# Patient Record
Sex: MALE | Race: WHITE | NOT HISPANIC OR LATINO | Employment: FULL TIME | URBAN - NONMETROPOLITAN AREA
[De-identification: names, ages, dates, MRNs, and addresses within clinical notes are randomized per-mention and may not be internally consistent; named-entity substitution may affect disease eponyms.]

---

## 2023-04-21 ENCOUNTER — HOSPITAL ENCOUNTER (OUTPATIENT)
Dept: RADIOLOGY | Facility: CLINIC | Age: 23
Discharge: HOME | End: 2023-04-21

## 2023-04-21 DIAGNOSIS — M54.50 CHRONIC MIDLINE LOW BACK PAIN WITHOUT SCIATICA: ICD-10-CM

## 2023-04-21 DIAGNOSIS — M53.3 NONTRAUMATIC COCCYDYNIA: ICD-10-CM

## 2023-04-21 DIAGNOSIS — G89.29 CHRONIC MIDLINE LOW BACK PAIN WITHOUT SCIATICA: ICD-10-CM

## 2023-04-24 NOTE — PROGRESS NOTES
PT Evaluation     Today's date: 2023  Patient name: Kendrick Thompson  : 2000  MRN: [id number]  Referring provider: Cayla Guillen MD  Dx:   Encounter Diagnosis     ICD-10-CM    1  Chronic midline low back pain without sciatica  M54 50 Ambulatory Referral to Physical Therapy    G89 29       2  Nontraumatic coccydynia  M53 3 Ambulatory Referral to Physical Therapy      3  Femoral anteversion of left lower extremity  Q65 89 Ambulatory Referral to Physical Therapy                     Assessment  Assessment details: PT notes the patient with decrease ROM and strength t/o the lumbar spine as well as the bilateral hip and LE with trunk/core weakness leading to increase pain levels and functional limitations with need for course of skilled therapy for 4 weeks with focus on lumbar stabilization, strengthening, manual therapy, analgesic modalities, and HEP  Impairments: abnormal or restricted ROM, activity intolerance, impaired physical strength, lacks appropriate home exercise program and pain with function  Understanding of Dx/Px/POC: good   Prognosis: good    Goals  ST  Initiate HEP  2  Decrease pain levels by 25-50%   3  Increase ROM by 25-50%   4  Increase strength by 1-2 mm grades  LT  Improve spinal stability with increase trunk/core strength   2  Decrease limitations with standing, walking and stairs  3  Decrease limitations with trunk movement, lifting and carrying  4  Decrease limitations with transfers and ADL  5   DC with HEP    Plan  Plan details: PT notes review of POC and findings with the patient who is in agreement with PT recommendations of course of skilled therapy     Patient would benefit from: PT eval and skilled physical therapy  Planned modality interventions: thermotherapy: hydrocollator packs  Planned therapy interventions: manual therapy, neuromuscular re-education, self care, strengthening, stretching, therapeutic exercise, home exercise program, graded exercise and flexibility  Frequency: 2x week  Duration in weeks: 4  Treatment plan discussed with: patient        Subjective Evaluation    History of Present Illness  Mechanism of injury: Patient reports dealing with increase LBP and bilateral hip pain for 6+ years with no MATEO  Patient reports over time the symptoms have worsened with prior treatment with PT and medications with resolution of symptoms but recently the symptoms have worsened with increase pain levels around the sacrum with feeling of weakness in the right hip and LE  Patient denies any radicular symptoms in the bilateral LE  Patient went to ortho MD for evaluation and found to have no degeneration or issues with x-ray of the lumbar spine with referral to OPPT to address LB symptoms  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 [employer] with no significant PMH  Pain  Current pain ratin  At best pain ratin  At worst pain ratin  Location: Lumbar spine   Quality: sharp, tight and pulling  Relieving factors: change in position  Aggravating factors: lifting      Diagnostic Tests  X-ray: normal  Treatments  Previous treatment: physical therapy and medication  Current treatment: physical therapy  Patient Goals  Patient goals for therapy: decreased pain, increased motion, increased strength, independence with ADLs/IADLs and return to sport/leisure activities          Objective     Postural Observations  Seated posture: fair  Standing posture: fair        Tenderness     Lumbar Spine  No tenderness in the spinous process  Left Hip   No tenderness in the PSIS, greater trochanter and sacroiliac joint  Right Hip   No tenderness in the PSIS, greater trochanter and sacroiliac joint       Neurological Testing     Reflexes   Left   Patellar (L4): normal (2+)  Achilles (S1): normal (2+)    Right   Patellar (L4): normal (2+)  Achilles (S1): normal (2+)    Active Range of Motion     Lumbar   Flexion: 47 degrees  with pain  Extension: 14 degrees  with pain  Left lateral flexion:  WFL  Right lateral flexion:  WFL  Left rotation:  WFL  Right rotation:  WFL  Left Hip   Flexion: 44 degrees   Extension: 5 degrees   Abduction: [facility]  External rotation (90/90): 21 degrees   Internal rotation (90/90): 11 degrees     Right Hip   Flexion: 49 degrees   Extension: 5 degrees   Abduction: WFL  External rotation (90/90): 17 degrees   Internal rotation (90/90): 11 degrees     Additional Active Range of Motion Details  Trunk/core weakness with abdominals of 3/5 and lumbar paraspinals of 3+/5   PT notes decrease ROM and strength t/o the bilateral hip and LE     Passive Range of Motion   Left Hip   Flexion: 54 degrees   Extension: 7 degrees   External rotation (90/90): 24 degrees   Internal rotation (90/90): 13 degrees     Right Hip   Flexion: 55 degrees   Extension: 8 degrees   External rotation (90/90): 22 degrees   Internal rotation (90/90): 13 degrees     Joint Play     Hypomobile: L2, L3, L4, L5 and S1     Strength/Myotome Testing     Left Hip   Planes of Motion   Flexion: 4+  Extension: 5  Abduction: 4+  Adduction: 5  External rotation: 4  Internal rotation: 4    Right Hip   Planes of Motion   Flexion: 4+  Extension: 5  Abduction: 4+  Adduction: 5  External rotation: 4  Internal rotation: 4    Tests     Left Hip   Negative SUSANAAMANDA long sit and Jorge  William: Positive  90/90 SLR: Positive  SLR: Positive  Right Hip   Negative AMANDA MEZA long sit and Jorge Thomas: Positive  90/90 SLR: Positive  SLR: Positive                Precautions:  Chronic LBP  POC 5/26/23    Manuals 4/26       Bilateral HS, quad, piriformis, and gastroc manual hip traction                                 Neuro Re-Ed        PPU with exhale         Prone OAL         Prone plank         Prone plank with hip ext and ABD        Supine Tball ABD crunch         Seated Tball Trunk rotation and PNF         Seated Tball LAQ and hip march         Ther Ex        Elliptical for cardio        [employer] Chop and Down         TB Palloff Press         TB Trunk Rotation         Side step and squat         S/L Clamshells                 HEP update/review  15 min        Ther Activity                        Gait Training                        Modalities

## 2023-04-26 ENCOUNTER — EVALUATION (OUTPATIENT)
Dept: PHYSICAL THERAPY | Facility: CLINIC | Age: 23
End: 2023-04-26

## 2023-04-26 DIAGNOSIS — Q65.89 FEMORAL ANTEVERSION OF LEFT LOWER EXTREMITY: ICD-10-CM

## 2023-04-26 DIAGNOSIS — G89.29 CHRONIC MIDLINE LOW BACK PAIN WITHOUT SCIATICA: ICD-10-CM

## 2023-04-26 DIAGNOSIS — M53.3 NONTRAUMATIC COCCYDYNIA: ICD-10-CM

## 2023-04-26 DIAGNOSIS — M54.50 CHRONIC MIDLINE LOW BACK PAIN WITHOUT SCIATICA: ICD-10-CM

## 2023-05-01 NOTE — PROGRESS NOTES
"Daily Note     Today's date: 2023  Patient name: Alyssia Smith  : 2000  MRN: [id number]  Referring provider: Glimar Turk MD  Dx:   Encounter Diagnosis     ICD-10-CM    1  Chronic midline low back pain without sciatica  M54 50     G89 29       2  Nontraumatic coccydynia  M53 3       3  Femoral anteversion of left lower extremity  Q65 89                      Subjective:  Patient reports he is compliant with HEP  Patient denies any LBP but states the back and hips are tight this morning  Objective: See treatment diary below      Assessment: Tolerated treatment well  PT notes start of POC with focus on lumbar stabilization and manual therapy to decrease symptoms and improve functional limitations to meet therapy goals  PT notes continuation of decrease ROM and strength t/o the lumbar spine as well as the bilateral hip and LE with trunk/core weakness with need for continuation of skilled therapy  Plan: Continue per plan of care        Precautions:  Chronic LBP  POC 23    Manuals  5      Bilateral HS, quad, piriformis, and gastroc manual hip traction   15 min                               Neuro Re-Ed        PPU with exhale   2x10   3\" Exhale       Prone OAL   10x Bilat       Prone plank   Seated Tball Resist Trunk Rotation   10x Bilat Single Black       Prone plank with hip ext and ABD        Supine Tball ABD crunch         Seated Tball Trunk rotation and PNF   10x Each Bilat       Seated Tball LAQ and hip march   10x Each Bilat       Ther Ex        Elliptical for cardio  10 min L1       Ron Chop and Down   10x Each Bilat 15#       TB Palloff Press   10x5\" Hold Single Black       TB Trunk Rotation   10x Bilat  Single Black       Side step and squat         S/L Clamshells                 HEP update/review  15 min        Ther Activity                        Gait Training                        Modalities                             "

## 2023-05-02 ENCOUNTER — OFFICE VISIT (OUTPATIENT)
Dept: PHYSICAL THERAPY | Facility: CLINIC | Age: 23
End: 2023-05-02

## 2023-05-02 DIAGNOSIS — G89.29 CHRONIC MIDLINE LOW BACK PAIN WITHOUT SCIATICA: Primary | ICD-10-CM

## 2023-05-02 DIAGNOSIS — M54.50 CHRONIC MIDLINE LOW BACK PAIN WITHOUT SCIATICA: Primary | ICD-10-CM

## 2023-05-02 DIAGNOSIS — Q65.89 FEMORAL ANTEVERSION OF LEFT LOWER EXTREMITY: ICD-10-CM

## 2023-05-02 DIAGNOSIS — M53.3 NONTRAUMATIC COCCYDYNIA: ICD-10-CM

## 2023-05-04 NOTE — PROGRESS NOTES
"Daily Note     Today's date: 2023  Patient name: Jessica Casillas  : 2000  MRN: [id number]  Referring provider: Justyna Bynum MD  Dx:   Encounter Diagnosis     ICD-10-CM    1  Chronic midline low back pain without sciatica  M54 50     G89 29       2  Nontraumatic coccydynia  M53 3       3  Femoral anteversion of left lower extremity  Q65 89                      Subjective:  Patient reports his thighs were sore and tired after the last session but no increase in LB symptoms  Objective: See treatment diary below      Assessment: Tolerated treatment well  PT notes continuation of decrease ROM and strength t/o the lumbar spine as well as the bilateral hip and LE with trunk/core weakness with need for continuation of skilled therapy  Plan: Continue per plan of care        Precautions:  Chronic LBP  POC 23    Manuals      Bilateral HS, quad, piriformis, and gastroc manual hip traction   15 min  15 min                              Neuro Re-Ed        PPU with exhale   2x10   3\" Exhale  2x10   3\" Exhale      Prone OAL   10x Bilat  10x Bilat      Prone plank   Seated Tball Resist Trunk Rotation   10x Bilat Single Black  10x Bilat   Single Black      Prone plank with hip ext and ABD        Supine Tball ABD crunch         Seated Tball Trunk rotation and PNF   10x Each Bilat  15x Each   Bilat      Seated Tball LAQ and hip march   10x Each Bilat  15x Each   Bilat      Ther Ex        Elliptical for cardio  10 min L1  10 min L1      [place] Chop and Down   10x Each Bilat 15#  10x Each   Bilat 15#      TB Palloff Press   10x5\" Hold Single Black  10x5\" Hold Single Black      TB Trunk Rotation   10x Bilat  Single Black  10x Bilat  Single Black      Side step and squat         S/L Clamshells                 HEP update/review  15 min        Ther Activity                        Gait Training                        Modalities                             "

## 2023-05-05 ENCOUNTER — OFFICE VISIT (OUTPATIENT)
Dept: PHYSICAL THERAPY | Facility: CLINIC | Age: 23
End: 2023-05-05

## 2023-05-05 DIAGNOSIS — G89.29 CHRONIC MIDLINE LOW BACK PAIN WITHOUT SCIATICA: Primary | ICD-10-CM

## 2023-05-05 DIAGNOSIS — Q65.89 FEMORAL ANTEVERSION OF LEFT LOWER EXTREMITY: ICD-10-CM

## 2023-05-05 DIAGNOSIS — M53.3 NONTRAUMATIC COCCYDYNIA: ICD-10-CM

## 2023-05-05 DIAGNOSIS — M54.50 CHRONIC MIDLINE LOW BACK PAIN WITHOUT SCIATICA: Primary | ICD-10-CM

## 2023-05-08 ENCOUNTER — APPOINTMENT (OUTPATIENT)
Dept: PHYSICAL THERAPY | Facility: CLINIC | Age: 23
End: 2023-05-08
Payer: COMMERCIAL

## 2023-05-08 NOTE — PROGRESS NOTES
"Daily Note     Today's date: 2023  Patient name: Nam Rushing  : 2000  MRN: [id number]  Referring provider: Lucero Multani MD  Dx:   Encounter Diagnosis     ICD-10-CM    1  Chronic midline low back pain without sciatica  M54 50     G89 29       2  Nontraumatic coccydynia  M53 3       3  Femoral anteversion of left lower extremity  Q65 89                      Subjective:  Patient reports his sinuses are bothering him today with allergies but overall states the back and hips are feeling good  Objective: See treatment diary below      Assessment: Tolerated treatment well  PT notes continuation of decrease ROM and strength t/o the lumbar spine as well as the bilateral hip and LE with trunk/core weakness with need for continuation of skilled therapy  Plan: Continue per plan of care        Precautions:  Chronic LBP  POC 23    Manuals     Bilateral HS, quad, piriformis, and gastroc manual hip traction   15 min  15 min  15 min                             Neuro Re-Ed        PPU with exhale   2x10   3\" Exhale  2x10   3\" Exhale  2x10   3\" Exhale     Prone OAL   10x Bilat  10x Bilat  On Tball  10x Bilat     Prone plank   Seated Tball Resist Trunk Rotation   10x Bilat Single Black  10x Bilat   Single Black  10x Bilat  Single Black     Prone plank with hip ext and ABD    Prone Tball Hip Extension 10x Bilat     Supine Tball ABD crunch     On Tball with 4# ball   10x     Seated Tball Trunk rotation and PNF   10x Each Bilat  15x Each   Bilat  10x Each   Bilat  4# Ball     Seated Tball LAQ and hip march   10x Each Bilat  15x Each   Bilat  DC    Ther Ex        Elliptical for cardio  10 min L1  10 min L1  10 min L2     Ron Chop and Down   10x Each Bilat 15#  10x Each   Bilat 15#  10x Each   15#     TB Palloff Press   10x5\" Hold Single Black  10x5\" Hold Single Black  10x5\" Hold Single Black     TB Trunk Rotation   10x Bilat  Single Black  10x Bilat  Single Black  10x Bilat   Singe " Black     Side step and squat         S/L Elidia                 HEP update/review  15 min        Ther Activity                        Gait Training                        Modalities

## 2023-05-09 ENCOUNTER — OFFICE VISIT (OUTPATIENT)
Dept: PHYSICAL THERAPY | Facility: CLINIC | Age: 23
End: 2023-05-09

## 2023-05-09 DIAGNOSIS — Q65.89 FEMORAL ANTEVERSION OF LEFT LOWER EXTREMITY: ICD-10-CM

## 2023-05-09 DIAGNOSIS — M53.3 NONTRAUMATIC COCCYDYNIA: ICD-10-CM

## 2023-05-09 DIAGNOSIS — M54.50 CHRONIC MIDLINE LOW BACK PAIN WITHOUT SCIATICA: Primary | ICD-10-CM

## 2023-05-09 DIAGNOSIS — G89.29 CHRONIC MIDLINE LOW BACK PAIN WITHOUT SCIATICA: Primary | ICD-10-CM

## 2023-05-11 NOTE — PROGRESS NOTES
"Daily Note     Today's date: 2023  Patient name: Russel Gill  : 2000  MRN: [id number]  Referring provider: Diony Nava MD  Dx:   Encounter Diagnosis     ICD-10-CM    1  Chronic midline low back pain without sciatica  M54 50     G89 29       2  Nontraumatic coccydynia  M53 3       3  Femoral anteversion of left lower extremity  Q65 89                      Subjective:  Patient reports he felt sore in the core after the last session to 3/10 level for a few hours but states the symptoms have dissipated this morning  Objective: See treatment diary below      Assessment: Tolerated treatment well  PT notes continuation of decrease ROM and strength t/o the lumbar spine with trunk/core weakness with need for continuation of skilled therapy  Plan: Continue per plan of care        Precautions:  Chronic LBP  POC 23    Manuals    Bilateral HS, quad, piriformis, and gastroc manual hip traction   15 min  15 min  15 min  15 min                           Neuro Re-Ed        PPU with exhale   2x10   3\" Exhale  2x10   3\" Exhale  2x10   3\" Exhale  2x10   3\" Exhale    Prone OAL   10x Bilat  10x Bilat  On Tball  10x Bilat  On Tball   10x Bilat    Prone plank   Seated Tball Resist Trunk Rotation   10x Bilat Single Black  10x Bilat   Single Black  10x Bilat  Single Black  10x Bilat  Single Black    Prone plank with hip ext and ABD    Prone Tball Hip Extension 10x Bilat  10x Bilat    Supine Tball ABD crunch     On Tball with 4# ball   10x  10x   4# Ball    Seated Tball Trunk rotation and PNF   10x Each Bilat  15x Each   Bilat  10x Each   Bilat  4# Ball  10x Each  4# Ball    Seated Tball LAQ and hip march   10x Each Bilat  15x Each   Bilat  DC    Ther Ex        Elliptical for cardio  10 min L1  10 min L1  10 min L2  10 min L2    Ron Chop and Down   10x Each Bilat 15#  10x Each   Bilat 15#  10x Each   15#  10x Each   15#    TB Palloff Press   10x5\" Hold Single Black  10x5\" Hold " "Single Black  10x5\" Hold Single Black  10x5\" Hold Single Black    TB Trunk Rotation   10x Bilat  Single Black  10x Bilat  Single Black  10x Bilat   Single Black  15x Bilat  Single    Side step and squat         S/L Clamshells                 HEP update/review  15 min        Ther Activity                        Gait Training                        Modalities                             "

## 2023-05-12 ENCOUNTER — OFFICE VISIT (OUTPATIENT)
Dept: PHYSICAL THERAPY | Facility: CLINIC | Age: 23
End: 2023-05-12

## 2023-05-12 DIAGNOSIS — G89.29 CHRONIC MIDLINE LOW BACK PAIN WITHOUT SCIATICA: Primary | ICD-10-CM

## 2023-05-12 DIAGNOSIS — Q65.89 FEMORAL ANTEVERSION OF LEFT LOWER EXTREMITY: ICD-10-CM

## 2023-05-12 DIAGNOSIS — M53.3 NONTRAUMATIC COCCYDYNIA: ICD-10-CM

## 2023-05-12 DIAGNOSIS — M54.50 CHRONIC MIDLINE LOW BACK PAIN WITHOUT SCIATICA: Primary | ICD-10-CM

## 2023-05-16 ENCOUNTER — APPOINTMENT (OUTPATIENT)
Dept: PHYSICAL THERAPY | Facility: CLINIC | Age: 23
End: 2023-05-16
Payer: COMMERCIAL

## 2023-05-16 NOTE — PROGRESS NOTES
"Daily Note     Today's date: 2023  Patient name: Jose Florentino  : 2000  MRN: [id number]  Referring provider: Ava Haas MD  Dx:   Encounter Diagnosis     ICD-10-CM    1  Chronic midline low back pain without sciatica  M54 50     G89 29       2  Nontraumatic coccydynia  M53 3       3  Femoral anteversion of left lower extremity  Q65 89                      Subjective: Pt reports no new complaints      Objective: See treatment diary below      Assessment:  Pt tolerated treatment session fairly well  Continuing to decreased flexibility of BLEs and is challenged with core strengthening exercises  He demonstrates fatigue post sesison and would benefit from continued OPPT sergvices     Plan: Continue per plan of care        Precautions:  Chronic LBP  POC 23    Manuals    Bilateral HS, quad, piriformis, and gastroc manual hip traction  15 min 15 min  15 min  15 min  15 min                           Neuro Re-Ed        PPU with exhale  2x10   3\" Exhale  2x10   3\" Exhale  2x10   3\" Exhale  2x10   3\" Exhale  2x10   3\" Exhale    Prone OAL  On Tball   10x Bilat  10x Bilat  10x Bilat  On Tball  10x Bilat  On Tball   10x Bilat    Prone plank  NT Seated Tball Resist Trunk Rotation   10x Bilat Single Black  10x Bilat   Single Black  10x Bilat  Single Black  10x Bilat  Single Black    Prone plank with hip ext and ABD 10x Bilat    Prone Tball Hip Extension 10x Bilat  10x Bilat    Supine Tball ABD crunch  10x   4# Ball    On Tball with 4# ball   10x  10x   4# Ball    Seated Tball Trunk rotation and PNF  10x Each  4# Ball  10x Each Bilat  15x Each   Bilat  10x Each   Bilat  4# Ball  10x Each  4# Ball    Seated Tball LAQ and hip march   10x Each Bilat  15x Each   Bilat  DC    Ther Ex        Elliptical for cardio 10 min L3 10 min L1  10 min L1  10 min L2  10 min L2    [place] Chop and Down  10x Each   15#  10x Each Bilat 15#  10x Each   Bilat 15#  10x Each   15#  10x Each   15#    TB Palloff " "Press  10x5\" Hold Single Black  10x5\" Hold Single Black  10x5\" Hold Single Black  10x5\" Hold Single Black  10x5\" Hold Single Black    TB Trunk Rotation  15x Bilat  Single  10x Bilat  Single Black  10x Bilat  Single Black  10x Bilat   Single Black  15x Bilat  Single    Side step and squat         S/L Clamshells                 HEP update/review         Ther Activity                        Gait Training                        Modalities                             "

## 2023-05-17 ENCOUNTER — OFFICE VISIT (OUTPATIENT)
Dept: PHYSICAL THERAPY | Facility: CLINIC | Age: 23
End: 2023-05-17

## 2023-05-17 DIAGNOSIS — M54.50 CHRONIC MIDLINE LOW BACK PAIN WITHOUT SCIATICA: Primary | ICD-10-CM

## 2023-05-17 DIAGNOSIS — G89.29 CHRONIC MIDLINE LOW BACK PAIN WITHOUT SCIATICA: Primary | ICD-10-CM

## 2023-05-17 DIAGNOSIS — M53.3 NONTRAUMATIC COCCYDYNIA: ICD-10-CM

## 2023-05-17 DIAGNOSIS — Q65.89 FEMORAL ANTEVERSION OF LEFT LOWER EXTREMITY: ICD-10-CM

## 2023-05-18 NOTE — PROGRESS NOTES
"Daily Note     Today's date: 2023  Patient name: Cayla Perez  : 2000  MRN: [id number]  Referring provider: Palmer Nash MD  Dx:   Encounter Diagnosis     ICD-10-CM    1  Chronic midline low back pain without sciatica  M54 50     G89 29       2  Nontraumatic coccydynia  M53 3       3  Femoral anteversion of left lower extremity  Q65 89                      Subjective: Patient reports no new c/o paint today  Patient reports that he is pleased with the progress he's making here at PT  Objective: See treatment diary below      Assessment: Tolerated treatment well  Patient exhibited good technique with therapeutic exercises and would benefit from continued PT to increase ROM/strength and endurance to improve mobility  Plan: Continue per plan of care        Precautions:  Chronic LBP  POC 23    Manuals    Bilateral HS, quad, piriformis, and gastroc manual hip traction  15 min 15'  15 min  15 min                           Neuro Re-Ed        PPU with exhale  2x10   3\" Exhale  3x10 3\"hold exhale  2x10   3\" Exhale  2x10   3\" Exhale    Prone OAL  On Tball   10x Bilat  On Tball 10x bilat  On Tball  10x Bilat  On Tball   10x Bilat    Prone plank  NT NT  10x Bilat  Single Black  10x Bilat  Single Black    Prone Tball Hip Extension 10x Bilat  NT  Prone Tball Hip Extension 10x Bilat  10x Bilat    On Tball w/ WB 10x   4# Ball  NT  On Tball with 4# ball   10x  10x   4# Ball    Seated Tball Trunk rotation and PNF  10x Each  4# Ball  10x ea 4#Ball  10x Each   Bilat  4# Ball  10x Each  4# Ball    Seated Tball LAQ and hip march     DC    Ther Ex        Elliptical for cardio 10 min L3 10' L3  10 min L2  10 min L2    [place] Chop and Down  10x Each   15#  15x ea 15#  10x Each   15#  10x Each   15#    TB Palloff Press  10x5\" Hold Single Black  15x5\" single black  10x5\" Hold Single Black  10x5\" Hold Single Black    TB Trunk Rotation  15x Bilat  Single  15x bilat single black  10x " Bilat   Single Black  15x Bilat  Single    Side step and squat         S/L Clamspatricio                 HEP update/review         Ther Activity  5/19                      Gait Training  5/19                      Modalities  5/19

## 2023-05-19 ENCOUNTER — EVALUATION (OUTPATIENT)
Dept: PHYSICAL THERAPY | Facility: CLINIC | Age: 23
End: 2023-05-19

## 2023-05-19 DIAGNOSIS — Q65.89 FEMORAL ANTEVERSION OF LEFT LOWER EXTREMITY: ICD-10-CM

## 2023-05-19 DIAGNOSIS — G89.29 CHRONIC MIDLINE LOW BACK PAIN WITHOUT SCIATICA: Primary | ICD-10-CM

## 2023-05-19 DIAGNOSIS — M54.50 CHRONIC MIDLINE LOW BACK PAIN WITHOUT SCIATICA: Primary | ICD-10-CM

## 2023-05-19 DIAGNOSIS — M53.3 NONTRAUMATIC COCCYDYNIA: ICD-10-CM

## 2023-05-19 NOTE — PROGRESS NOTES
PT Re-Evaluation     Today's date: 2023  Patient name: Curtis Jackson  : 2000  MRN: [id number]  Referring provider: Mell Del Real MD  Dx:   Encounter Diagnosis     ICD-10-CM    1  Chronic midline low back pain without sciatica  M54 50     G89 29       2  Nontraumatic coccydynia  M53 3       3  Femoral anteversion of left lower extremity  Q65 89                      Assessment  Assessment details: Pt has attended a total of 7 PT sessions and has made fairly progress towards goals  He reports a 60% improvement and objectively has improved his spinal ROM, trunk strength, core strength and ms endurance  Pt continues to deal with some ms endurance deficits and some discomfort in his lumbar spine with prolonged strenuous activity  Pt would benefit from continued OP PT services in order to address remaining deficits and limitations  Thank you! Impairments: activity intolerance and lacks appropriate home exercise program  Understanding of Dx/Px/POC: good   Prognosis: good    Goals  ST  Initiate HEP  met  2  Decrease pain levels by 25-50%   met  3  Increase ROM by 25-50%   met  4  Increase strength by 1-2 mm grades  met  LT  Improve spinal stability with increase trunk/core strength   progressing  2  Decrease limitations with standing, walking and stairs  progressing  3  Decrease limitations with trunk movement, lifting and carrying  progressing  4  Decrease limitations with transfers and ADL  progressing  5  DC with HEP  progressing    Plan  Plan details: PT notes review of POC and findings with the patient who is in agreement with PT recommendations of course of skilled therapy     Patient would benefit from: PT eval and skilled physical therapy  Planned modality interventions: thermotherapy: hydrocollator packs  Planned therapy interventions: manual therapy, neuromuscular re-education, self care, strengthening, stretching, therapeutic exercise, home exercise program, graded exercise and flexibility  Frequency: 2x week  Duration in weeks: 4  Treatment plan discussed with: patient        Subjective Evaluation    History of Present Illness  Mechanism of injury: Patient reports dealing with increase LBP and bilateral hip pain for 6+ years with no MATEO  Patient reports over time the symptoms have worsened with prior treatment with PT and medications with resolution of symptoms but recently the symptoms have worsened with increase pain levels around the sacrum with feeling of weakness in the right hip and LE  Patient denies any radicular symptoms in the bilateral LE  Patient went to ortho MD for evaluation and found to have no degeneration or issues with x-ray of the lumbar spine with referral to OPPT to address LB symptoms  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  Presently patient has attended 7 OPPT sessions and reports 60% improvement since starting PT  He notes that he is no longer dealing with daily pain and that he is tolerating hobbies/work shifts better  He continues to deal with some back ache after prolonged activity but is not severe in the past  Pt is motivated to continue progressing towards goals over the next few weeks  Pain  Current pain ratin  At best pain ratin  At worst pain rating: 3  Location: Lumbar spine   Quality: dull ache  Relieving factors: change in position  Aggravating factors: lifting      Diagnostic Tests  X-ray: normal  Treatments  Previous treatment: physical therapy and medication  Current treatment: physical therapy  Patient Goals  Patient goals for therapy: decreased pain, increased motion, increased strength, independence with ADLs/IADLs and return to sport/leisure activities          Objective     Postural Observations  Seated posture: fair  Standing posture: fair        Tenderness     Lumbar Spine  No tenderness in the spinous process       Left Hip   No tenderness in the PSIS, greater trochanter and sacroiliac joint      Right Hip   No tenderness in the PSIS, greater trochanter and sacroiliac joint  Neurological Testing     Reflexes   Left   Patellar (L4): normal (2+)  Achilles (S1): normal (2+)    Right   Patellar (L4): normal (2+)  Achilles (S1): normal (2+)    Active Range of Motion     Lumbar   Flexion: 60 degrees   Extension: 17 degrees   Left lateral flexion:  WFL  Right lateral flexion:  WFL  Left rotation:  WFL  Right rotation:  WFL  Left Hip   Flexion: 44 degrees   Extension: 5 degrees   Abduction: WFL  External rotation (90/90): 21 degrees   Internal rotation (90/90): 11 degrees     Right Hip   Flexion: 49 degrees   Extension: 5 degrees   Abduction: WFL  External rotation (90/90): 17 degrees   Internal rotation (90/90): 11 degrees     Additional Active Range of Motion Details  PT notes decrease ROM and strength t/o the bilateral hip and LE     Passive Range of Motion   Left Hip   Flexion: 54 degrees   Extension: 7 degrees   External rotation (90/90): 24 degrees   Internal rotation (90/90): 13 degrees     Right Hip   Flexion: 55 degrees   Extension: 8 degrees   External rotation (90/90): 22 degrees   Internal rotation (90/90): 13 degrees     Strength/Myotome Testing     Left Hip   Planes of Motion   Flexion: 4+  Extension: 5  Abduction: 4+  Adduction: 5  External rotation: 4  Internal rotation: 4    Right Hip   Planes of Motion   Flexion: 4+  Extension: 5  Abduction: 4+  Adduction: 5  External rotation: 4  Internal rotation: 4    Tests     Left Hip   Negative SUSANAAMANDA long sit and Jorge Thomas: Positive  90/90 SLR: Positive  SLR: Positive  Right Hip   Negative SUSANAAMANDA long sit and Jorge Thomas: Positive  90/90 SLR: Positive  SLR: Positive                Precautions:  Chronic LBP  POC 5/26/23

## 2023-05-22 ENCOUNTER — APPOINTMENT (OUTPATIENT)
Dept: PHYSICAL THERAPY | Facility: CLINIC | Age: 23
End: 2023-05-22
Payer: COMMERCIAL

## 2023-05-22 NOTE — PROGRESS NOTES
"Daily Note     Today's date: 2023  Patient name: Juliet Hughes  : 2000  MRN: [id number]  Referring provider: Jose Martin Diggs MD  Dx:   Encounter Diagnosis     ICD-10-CM    1  Chronic midline low back pain without sciatica  M54 50     G89 29       2  Nontraumatic coccydynia  M53 3       3  Femoral anteversion of left lower extremity  Q65 89                      Subjective:  Patient reports he played 3 games of tennis over the weekend with minimal increase in LB soreness with performance  Objective: See treatment diary below      Assessment: Tolerated treatment well  PT notes continuation of decrease ROM and strength t/o the lumbar spine with trunk/core weakness with need for continuation of skilled therapy  Plan: Continue per plan of care        Precautions:  Chronic LBP  POC 23    Manuals      Bilateral HS, quad, piriformis, and gastroc manual hip traction  15 min 15' 15 min                              Neuro Re-Ed        PPU with exhale  2x10   3\" Exhale  3x10 3\"hold exhale 2x10   3\" Exhale      Prone OAL  On Tball   10x Bilat  On Tball 10x bilat On Tball  15x Bilat      Prone plank  NT NT Push/pull cart   40#  5x30 Feet      Prone Tball Hip Extension 10x Bilat  NT 10x Bilat      On Tball w/ WB   BOSU   3 way SLR   10x Bilat      Seated Tball Trunk rotation and PNF  10x Each  4# Ball  10x ea 4#Ball 15x Each   4# Ball      Seated Tball LAQ and hip march         Ther Ex        Elliptical for cardio 10 min L3 10' L3 10 min L3      [place] Chop and Down  10x Each   15#  15x ea 15# 15x Each   15#      TB Palloff Press  10x5\" Hold Single Black  15x5\" single black DC     TB Trunk Rotation  15x Bilat  Single  15x bilat single black DC     Side step and squat    Leg Press   SL Only   85# 2x10 Each      S/L Clamshells                 HEP update/review         Ther Activity                        Gait Training                        Modalities                 "

## 2023-05-23 ENCOUNTER — OFFICE VISIT (OUTPATIENT)
Dept: PHYSICAL THERAPY | Facility: CLINIC | Age: 23
End: 2023-05-23

## 2023-05-23 DIAGNOSIS — Q65.89 FEMORAL ANTEVERSION OF LEFT LOWER EXTREMITY: ICD-10-CM

## 2023-05-23 DIAGNOSIS — M54.50 CHRONIC MIDLINE LOW BACK PAIN WITHOUT SCIATICA: Primary | ICD-10-CM

## 2023-05-23 DIAGNOSIS — M53.3 NONTRAUMATIC COCCYDYNIA: ICD-10-CM

## 2023-05-23 DIAGNOSIS — G89.29 CHRONIC MIDLINE LOW BACK PAIN WITHOUT SCIATICA: Primary | ICD-10-CM

## 2023-05-24 NOTE — PROGRESS NOTES
"Daily Note     Today's date: 2023  Patient name: Jdaen Tse  : 2000  MRN: [id number]  Referring provider: Mabel Torres MD  Dx:   Encounter Diagnosis     ICD-10-CM    1  Chronic midline low back pain without sciatica  M54 50     G89 29       2  Nontraumatic coccydynia  M53 3       3  Femoral anteversion of left lower extremity  Q65 89                      Subjective:  Patient reports the back continues to slowly improve with improved flexibility and strength  Objective: See treatment diary below      Assessment: Tolerated treatment well  PT notes continuation of continuation of decrease ROM and strength t/o the lumbar spine as well as the bilateral hip and LE with trunk/core weakness with need for continuation of skilled therapy  Plan: Continue per plan of care        Precautions:  Chronic LBP  POC 23    Manuals     Bilateral HS, quad, piriformis, and gastroc manual hip traction  15 min 15' 15 min  15 min                             Neuro Re-Ed        PPU with exhale  2x10   3\" Exhale  3x10 3\"hold exhale 2x10   3\" Exhale  2x10   3\" Exhale     Prone OAL  On Tball   10x Bilat  On Tball 10x bilat On Tball  15x Bilat  DC     Prone plank  NT NT Push/pull cart   40#  5x30 Feet  50#   5x30 Feet     Prone Tball Hip Extension 10x Bilat  NT 10x Bilat  2x10 Bilat     On Tball w/ WB   BOSU   3 way SLR   10x Bilat  BOSU   3 way  SLR   15x Bilat      Seated Tball Trunk rotation and PNF  10x Each  4# Ball  10x ea 4#Ball 15x Each   4# Ball  15x Each   4# Ball     Seated Tball LAQ and hip march     BOSU Squat   2x10   6# Ball    Ther Ex        Elliptical for cardio 10 min L3 10' L3 10 min L3  10 min L4     Ron Chop and Down  10x Each   15#  15x ea 15# 15x Each   15#  15x Each     TB Palloff Press  10x5\" Hold Single Black  15x5\" single black DC     TB Trunk Rotation  15x Bilat  Single  15x bilat single black DC     Leg Press SL Only    Leg Press   SL Only   85# " 2x10 Each  95# SL   2x10   Bilat     S/L Elidia                 HEP update/review         Ther Activity  5/19                      Gait Training  5/19                      Modalities  5/19

## 2023-05-25 ENCOUNTER — OFFICE VISIT (OUTPATIENT)
Dept: PHYSICAL THERAPY | Facility: CLINIC | Age: 23
End: 2023-05-25

## 2023-05-25 DIAGNOSIS — Q65.89 FEMORAL ANTEVERSION OF LEFT LOWER EXTREMITY: ICD-10-CM

## 2023-05-25 DIAGNOSIS — M53.3 NONTRAUMATIC COCCYDYNIA: ICD-10-CM

## 2023-05-25 DIAGNOSIS — M54.50 CHRONIC MIDLINE LOW BACK PAIN WITHOUT SCIATICA: Primary | ICD-10-CM

## 2023-05-25 DIAGNOSIS — G89.29 CHRONIC MIDLINE LOW BACK PAIN WITHOUT SCIATICA: Primary | ICD-10-CM

## 2023-05-26 ENCOUNTER — APPOINTMENT (OUTPATIENT)
Dept: PHYSICAL THERAPY | Facility: CLINIC | Age: 23
End: 2023-05-26
Payer: COMMERCIAL

## 2023-05-26 NOTE — PROGRESS NOTES
"Daily Note     Today's date: 2023  Patient name: Aden Rust  : 2000  MRN: [id number]  Referring provider: Rosalba Torres MD  Dx:   Encounter Diagnosis     ICD-10-CM    1  Chronic midline low back pain without sciatica  M54 50     G89 29       2  Nontraumatic coccydynia  M53 3       3  Femoral anteversion of left lower extremity  Q65 89                      Subjective: ***      Objective: See treatment diary below      Assessment: Tolerated treatment {Tolerated treatment :[id number]}  PT notes continuation of decrease ROM and strength t/o the bilateral hip and LE with trunk/core weakness with need for continuation of skilled therapy  Plan: Continue per plan of care        Precautions:  Chronic LBP  POC 23    Manuals    Bilateral HS, quad, piriformis, and gastroc manual hip traction  15 min 15' 15 min  15 min                             Neuro Re-Ed        PPU with exhale  2x10   3\" Exhale  3x10 3\"hold exhale 2x10   3\" Exhale  2x10   3\" Exhale     Prone OAL  On Tball   10x Bilat  On Tball 10x bilat On Tball  15x Bilat  DC     Prone plank  NT NT Push/pull cart   40#  5x30 Feet  50#   5x30 Feet     Prone Tball Hip Extension 10x Bilat  NT 10x Bilat  2x10 Bilat     On Tball w/ WB   BOSU   3 way SLR   10x Bilat  BOSU   3 way  SLR   15x Bilat      Seated Tball Trunk rotation and PNF  10x Each  4# Ball  10x ea 4#Ball 15x Each   4# Ball  15x Each   4# Ball     Seated Tball LAQ and hip march     BOSU Squat   2x10   6# Ball    Ther Ex        Elliptical for cardio 10 min L3 10' L3 10 min L3  10 min L4     [place] Chop and Down  10x Each   15#  15x ea 15# 15x Each   15#  15x Each     TB Palloff Press  10x5\" Hold Single Black  15x5\" single black DC     TB Trunk Rotation  15x Bilat  Single  15x bilat single black DC     Leg Press SL Only    Leg Press   SL Only   85# 2x10 Each  95# SL   2x10   Bilat     S/L Clamshells                 HEP update/review         Ther " Activity  5/19                      Gait Training  5/19                      Modalities  5/19

## 2023-05-30 ENCOUNTER — APPOINTMENT (OUTPATIENT)
Dept: PHYSICAL THERAPY | Facility: CLINIC | Age: 23
End: 2023-05-30
Payer: COMMERCIAL

## 2023-05-30 DIAGNOSIS — M54.50 CHRONIC MIDLINE LOW BACK PAIN WITHOUT SCIATICA: Primary | ICD-10-CM

## 2023-05-30 DIAGNOSIS — Q65.89 FEMORAL ANTEVERSION OF LEFT LOWER EXTREMITY: ICD-10-CM

## 2023-05-30 DIAGNOSIS — G89.29 CHRONIC MIDLINE LOW BACK PAIN WITHOUT SCIATICA: Primary | ICD-10-CM

## 2023-05-30 DIAGNOSIS — M53.3 NONTRAUMATIC COCCYDYNIA: ICD-10-CM

## 2023-05-31 NOTE — PROGRESS NOTES
"Daily Note     Today's date: 2023  Patient name: Devi Rojas  : 2000  MRN: [id number]  Referring provider: Ed Bran MD  Dx:   Encounter Diagnosis     ICD-10-CM    1  Chronic midline low back pain without sciatica  M54 50     G89 29       2  Nontraumatic coccydynia  M53 3       3  Femoral anteversion of left lower extremity  Q65 89           Start Time: 0855  Stop Time: 0951  Total time in clinic (min): 56 minutes    Subjective:  Patient indicated that his back continues to feel good  Objective: See treatment diary below        Assessment:  Therapeutic exercise program was completed with good technique and no previous pain or symptoms  Continue to recommend PT in order to achieve maximal functional independence  PT notes the patient is approaching all therapy goals so PT will plan on transition to HEP next week  Plan: Continue per plan of care        Precautions:  Chronic LBP  POC 23    Manuals    Bilateral HS, quad, piriformis, and gastroc manual hip traction  15 min 15' 15 min  15 min  15'                           Neuro Re-Ed     PPU with exhale  2x10   3\" Exhale  3x10 3\"hold exhale 2x10   3\" Exhale  2x10   3\" Exhale  2x10 3\" exhale    Prone OAL  On Tball   10x Bilat  On Tball 10x bilat On Tball  15x Bilat  DC  DC    Prone plank  NT NT Push/pull cart   40#  5x30 Feet  50#   5x30 Feet   50# 5x30 feet    Prone Tball Hip Extension 10x Bilat  NT 10x Bilat  2x10 Bilat  2x10 b/l   On Tball w/ WB   BOSU   3 way SLR   10x Bilat  BOSU   3 way  SLR   15x Bilat   BOSU 3 way SLR 15x b/l    Seated Tball Trunk rotation and PNF  10x Each  4# Ball  10x ea 4#Ball 15x Each   4# Ball  15x Each   4# Ball  15x ea 4# ball    Seated Tball LAQ and hip march     BOSU Squat   2x10   6# Ball BOSU squat 2x10 6# ball    Ther Ex     Elliptical for cardio 10 min L3 10' L3 10 min L3  10 min L4  10' L4    Ron Chop and Down  10x Each   15#  15x ea 15# 15x " "Each   15#  15x Each  15x ea   TB Palloff Press  10x5\" Hold Single Black  15x5\" single black DC     TB Trunk Rotation  15x Bilat  Single  15x bilat single black DC     Leg Press SL Only    Leg Press   SL Only   85# 2x10 Each  95# SL   2x10   Bilat  95# SL 2x10 B/L    S/L Elidia                 HEP update/review         Ther Activity  5/19                      Gait Training  5/19                      Modalities  5/19                                    "

## 2023-06-01 ENCOUNTER — OFFICE VISIT (OUTPATIENT)
Dept: PHYSICAL THERAPY | Facility: CLINIC | Age: 23
End: 2023-06-01

## 2023-06-01 DIAGNOSIS — G89.29 CHRONIC MIDLINE LOW BACK PAIN WITHOUT SCIATICA: Primary | ICD-10-CM

## 2023-06-01 DIAGNOSIS — M54.50 CHRONIC MIDLINE LOW BACK PAIN WITHOUT SCIATICA: Primary | ICD-10-CM

## 2023-06-01 DIAGNOSIS — M53.3 NONTRAUMATIC COCCYDYNIA: ICD-10-CM

## 2023-06-01 DIAGNOSIS — Q65.89 FEMORAL ANTEVERSION OF LEFT LOWER EXTREMITY: ICD-10-CM

## 2023-06-05 NOTE — PROGRESS NOTES
"Daily Note     Today's date: 2023  Patient name: Jose Ying  : 2000  MRN: [id number]  Referring provider: Juan Jose Geronimo MD  Dx:   Encounter Diagnosis     ICD-10-CM    1  Chronic midline low back pain without sciatica  M54 50     G89 29       2  Nontraumatic coccydynia  M53 3       3  Femoral anteversion of left lower extremity  Q65 89                      Subjective:  Patient reports his knees are sore to 5/10 level after playing multiple games of tennis over the weekend and after work yesterday  Patient denies any LB or hip symptoms  Objective: See treatment diary below      Assessment: Tolerated treatment well  PT notes the patient is approaching all therapy goals and PT will plan on transition to HEP this week  Plan: Continue per plan of care        Precautions:  Chronic LBP  POC 23    Manuals    Bilateral HS, quad, piriformis, and gastroc manual hip traction  15' 15 min  15 min  15' 15 min                            Neuro Re-Ed     PPU with exhale  3x10 3\"hold exhale 2x10   3\" Exhale  2x10   3\" Exhale  2x10 3\" exhale  2x10  3\" Exhale    Prone OAL  On Tball 10x bilat On Tball  15x Bilat  DC  DC     Prone plank  NT Push/pull cart   40#  5x30 Feet  50#   5x30 Feet   50# 5x30 feet  60# 5x30 Feet    Prone Tball Hip Extension NT 10x Bilat  2x10 Bilat  2x10 b/l 2x10 Bilat LE    On Tball w/ WB  BOSU   3 way SLR   10x Bilat  BOSU   3 way  SLR   15x Bilat   BOSU 3 way SLR 15x b/l  BOSU   3 way SLR   2x10 Bilat    Seated Tball Trunk rotation and PNF  10x ea 4#Ball 15x Each   4# Ball  15x Each   4# Ball  15x ea 4# ball  2x10 Each   6# Ball    Seated Tball LAQ and hip march    BOSU Squat   2x10   6# Ball BOSU squat 2x10 6# ball  BOSU Squat   2x10   8# ball    Ther Ex     Elliptical for cardio 10' L3 10 min L3  10 min L4  10' L4  10 min L5    [place] Chop and Down  15x ea 15# 15x Each   15#  15x Each  15x ea NT   TB Palloff Press  15x5\" single " black DC      TB Trunk Rotation  15x bilat single black DC      Leg Press SL Only   Leg Press   SL Only   85# 2x10 Each  95# SL   2x10   Bilat  95# SL 2x10 B/L  95# SL  2x10 Bilat    S/L Elidia                 HEP update/review         Ther Activity 5/19                       Gait Training 5/19                       Modalities 5/19

## 2023-06-06 ENCOUNTER — HOSPITAL ENCOUNTER (OUTPATIENT)
Dept: RADIOLOGY | Facility: CLINIC | Age: 23
Discharge: HOME | End: 2023-06-06
Payer: COMMERCIAL

## 2023-06-06 ENCOUNTER — OFFICE VISIT (OUTPATIENT)
Dept: OBGYN CLINIC | Facility: CLINIC | Age: 23
End: 2023-06-06
Payer: COMMERCIAL

## 2023-06-06 ENCOUNTER — OFFICE VISIT (OUTPATIENT)
Dept: PHYSICAL THERAPY | Facility: CLINIC | Age: 23
End: 2023-06-06
Payer: COMMERCIAL

## 2023-06-06 VITALS
TEMPERATURE: 98 F | WEIGHT: 233 LBS | HEIGHT: 74 IN | HEART RATE: 91 BPM | SYSTOLIC BLOOD PRESSURE: 120 MMHG | BODY MASS INDEX: 29.9 KG/M2 | DIASTOLIC BLOOD PRESSURE: 78 MMHG

## 2023-06-06 DIAGNOSIS — M22.2X2 PATELLOFEMORAL PAIN SYNDROME OF BOTH KNEES: ICD-10-CM

## 2023-06-06 DIAGNOSIS — G89.29 CHRONIC PAIN OF LEFT KNEE: Primary | ICD-10-CM

## 2023-06-06 DIAGNOSIS — M22.2X1 PATELLOFEMORAL PAIN SYNDROME OF BOTH KNEES: ICD-10-CM

## 2023-06-06 DIAGNOSIS — G89.29 CHRONIC PAIN OF LEFT KNEE: ICD-10-CM

## 2023-06-06 DIAGNOSIS — M53.3 NONTRAUMATIC COCCYDYNIA: ICD-10-CM

## 2023-06-06 DIAGNOSIS — G89.29 CHRONIC MIDLINE LOW BACK PAIN WITHOUT SCIATICA: Primary | ICD-10-CM

## 2023-06-06 DIAGNOSIS — M25.562 CHRONIC PAIN OF LEFT KNEE: ICD-10-CM

## 2023-06-06 DIAGNOSIS — M54.50 CHRONIC MIDLINE LOW BACK PAIN WITHOUT SCIATICA: Primary | ICD-10-CM

## 2023-06-06 DIAGNOSIS — G89.29 CHRONIC MIDLINE LOW BACK PAIN WITHOUT SCIATICA: ICD-10-CM

## 2023-06-06 DIAGNOSIS — M54.50 CHRONIC MIDLINE LOW BACK PAIN WITHOUT SCIATICA: ICD-10-CM

## 2023-06-06 DIAGNOSIS — Q65.89 FEMORAL ANTEVERSION OF LEFT LOWER EXTREMITY: ICD-10-CM

## 2023-06-06 DIAGNOSIS — M25.562 CHRONIC PAIN OF LEFT KNEE: Primary | ICD-10-CM

## 2023-06-06 PROCEDURE — 73562 X-RAY EXAM OF KNEE 3: CPT

## 2023-06-06 PROCEDURE — 99214 OFFICE O/P EST MOD 30 MIN: CPT | Performed by: STUDENT IN AN ORGANIZED HEALTH CARE EDUCATION/TRAINING PROGRAM

## 2023-06-06 PROCEDURE — 97110 THERAPEUTIC EXERCISES: CPT | Performed by: PHYSICAL THERAPIST

## 2023-06-06 PROCEDURE — 97112 NEUROMUSCULAR REEDUCATION: CPT | Performed by: PHYSICAL THERAPIST

## 2023-06-06 PROCEDURE — 97140 MANUAL THERAPY 1/> REGIONS: CPT | Performed by: PHYSICAL THERAPIST

## 2023-06-06 NOTE — PROGRESS NOTES
"1  Chronic pain of left knee  XR knee 3 vw left non injury    Ambulatory Referral to Physical Therapy      2  Patellofemoral pain syndrome of both knees  XR knee 3 vw left non injury    Ambulatory Referral to Physical Therapy      3  Chronic midline low back pain without sciatica        4  Nontraumatic coccydynia          Orders Placed This Encounter   Procedures   • XR knee 3 vw left non injury   • Ambulatory Referral to Physical Therapy        Imaging Studies (I personally reviewed images in PACS and report):    • X-ray left knee 6/6/2023: No acute osseous abnormalities  Mild lateral patellofemoral knee joint degenerative changes  No joint effusion  • X-ray lumbar spine 4/21/2023: No acute osseous abnormalities  Alignment intact  No significant degenerative changes  • X-ray sacrum/coccyx: No acute osseous abnormalities  IMPRESSION:  • Chronic axial low back pain -improved status post formal PT  • Nontraumatic chronic coccydynia  • History of reported left femoral anteversion  • Separate issue addressed today: Acute atraumatic left knee pain secondary to patellofemoral knee pain syndrome      Other factors:  • Reported history of femoral anteversion of left lower extremity  Patient states it history of being \"pigeon toed\" which has progressively improved with physical therapy in the past but has noticed while working that he will tend to antevert his left lower extremity after working several hours  • 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    PLAN:    • Clinical exam and radiographic imaging reviewed with patient today, with impression as per above  I have discussed with the patient the pathophysiology of this diagnosis and reviewed how the examination correlates with this diagnosis     • Reassured patient of his progressive improvement since last visit  • I have placed a new order for physical therapy in regards to his separate issue discussed today regards to patellofemoral knee " "pain syndrome  Radiographs were obtained of his left knee as noted above  I will follow-up official radiology interpretation  • The patellofemoral knee pain syndrome is a condition that can improve with physical therapy as well, patella stabilizing bracing as needed  Patient deferred patella stabilizing knee brace today and will continue formal physical therapy  • In regards to pain control I recommended as needed use of acetaminophen, NSAIDs, heat/ice therapy torments on/off, TENS machine use 3 times a day for 15-minute sessions  Return if symptoms worsen or fail to improve  Portions of the record may have been created with voice recognition software  Occasional wrong word or \"sound a like\" substitutions may have occurred due to the inherent limitations of voice recognition software  Read the chart carefully and recognize, using context, where substitutions have occurred  CHIEF COMPLAINT:  Chief Complaint   Patient presents with   • Lower Back - Follow-up   • Follow-up         HPI:  Imani Scott is a 23 y o  male  who presents with his mother for     Visit 6/6/2023: Follow-up low back pain/tailbone pain:  Patient has been attending formal physical therapy since last visit  Patient reports improvement of his low back symptoms since attending  He states there is minimal to no pain of his low back during his activities of daily living nor when he is exercising  His main concern today is that he has been developing left knee pain/crepitus over the past few weeks  He states he has always had crepitus of his knee but never was painful  He notices worsening when he is active such as playing tennis  Otherwise it is mild/tolerable during his work-related duties  He denies prior surgeries of his right knee in the past   Denies any swelling, discoloration, numbness/tingling    In regards to treatments, he works on stretching exercises from his physical therapist, resting, heat/icing all which provide " "relief  Denies any sensation of giving out or locking in extension  Medical, Surgical, Family, and Social History    Past Medical History:   Diagnosis Date   • Femoral anteversion      History reviewed  No pertinent surgical history  Social History   Social History     Substance and Sexual Activity   Alcohol Use Never     Social History     Substance and Sexual Activity   Drug Use Never     Social History     Tobacco Use   Smoking Status Never   Smokeless Tobacco Never     History reviewed  No pertinent family history  Allergies   Allergen Reactions   • Apple - Food Allergy Throat Swelling   • Penicillins Hives   • Vancomycin Hives          Physical Exam  /78 (BP Location: Left arm)   Pulse 91   Temp 98 °F (36 7 °C) (Temporal)   Ht 6' 2\" (1 88 m)   Wt 106 kg (233 lb)   BMI 29 92 kg/m²     Constitutional:  see vital signs  Gen: well-developed, normocephalic/atraumatic, well-groomed  Eyes: No inflammation or discharge of conjunctiva or lids; sclera clear   Pharynx: no inflammation, lesion, or mass of lips  Neck: supple, no masses, non-distended  MSK: no inflammation, lesion, mass, or clubbing of nails and digits except for other than mentioned below  SKIN: no visible rashes or skin lesions  Pulmonary/Chest: Effort normal  No respiratory distress  NEURO: cranial nerves grossly intact  PSYCH:  Alert and oriented to person, place, and time; recent and remote memory intact; mood normal, no depression, anxiety, or agitation, judgment and insight good and intact     Ortho Exam  BACK EXAM:  Gait: normal, no trendelenberg gait, no antalgic gait; no anteversion of his LLE seen during walking   Patient reports it occurs towards the end of the day when he is fatigued from work    BACK TENDERNESS:  Spinous Processes: None  Paraspinal Muscles: no  SI Joint: no  Sacrum: no    ROM:  Flexion: 90  Extension: 30  Lateral flexion: 30 b/l  Rotation: intact: 30 b/l    DERMATOMAL SENSATION:  L1: normal   L2: normal " L3: normal   L4: normal   L5: normal   S1: normal    STRENGTH (bilateral):  Knee Extension: 5/5  Knee Flexion: 5/5  Foot Dorsiflexion: 5/5  Great Toe Extension: 5/5  Foot Plantarflexion: 5/5  Hip Flexion: 5/5    REFLEXES:  Patellar: 2+ bilateral  Achilles: 2+ bilateral  Clonus: negative bilateral    BACK:   SUPINE STRAIGHT LEG: negative    HIP:  LOG ROLL: negative  SUSANA: negative  FADIR: negative    Left knee Exam:  Erythema: no  Swelling: no  Increased Warmth: no  Tenderness: Medial/lateral patellofemoral joint line  ROM: 0-130  Knee flexion strength: 5/5  Knee extension strength: 5/5  Patellar Apprehension: negative  Patellar Grind: +  Lachman's: negative  Anterior Drawer: negative  Varus laxity: negative  Valgus laxity: negative  Chinyere: negative

## 2023-06-07 NOTE — PROGRESS NOTES
"Daily Note     Today's date: 2023  Patient name: Eliana Maynard  : 2000  MRN: [id number]  Referring provider: Jelena Villatoro MD  Dx:   Encounter Diagnosis     ICD-10-CM    1  Chronic midline low back pain without sciatica  M54 50     G89 29       2  Nontraumatic coccydynia  M53 3       3  Femoral anteversion of left lower extremity  Q65 89                      Subjective:  Patient reports the back and hips are a little tight this morning but overall feeling better  Objective: See treatment diary below      Assessment: Tolerated treatment well  PT notes the patient is approaching all therapy so PT will plan on transition to HEP next session  Plan: Potential discharge next visit       Precautions:  Chronic LBP  POC 23    Manuals    Bilateral HS, quad, piriformis, and gastroc manual hip traction  15 min  15 min  15' 15 min  15 min                            Neuro Re-Ed        PPU with exhale  2x10   3\" Exhale  2x10   3\" Exhale  2x10 3\" exhale  2x10  3\" Exhale  2x10   3\" Exhale    Prone OAL  On Tball  15x Bilat  DC  DC      Pus/pull cart Push/pull cart   40#  5x30 Feet  50#   5x30 Feet   50# 5x30 feet  60# 5x30 Feet  60#   6x30 Feet    Prone Tball Hip Extension 10x Bilat  2x10 Bilat  2x10 b/l 2x10 Bilat LE  2x10 Bilat LE    BOSU 3 way SLR  BOSU   3 way SLR   10x Bilat  BOSU   3 way  SLR   15x Bilat   BOSU 3 way SLR 15x b/l  BOSU   3 way SLR   2x10 Bilat  BOSU   3 way SLR  2x10 Bilat    Seated Tball Trunk rotation and PNF  15x Each   4# Ball  15x Each   4# Ball  15x ea 4# ball  2x10 Each   6# Ball  2x10 Each   6# Ball   Seated Tball LAQ and hip march   BOSU Squat   2x10   6# Ball BOSU squat 2x10 6# ball  BOSU Squat   2x10   8# ball  BOSU Squat   2x10   8# Ball   Ther Ex    6/     Elliptical for cardio 10 min L3  10 min L4  10' L4  10 min L5  10 min L5    [place]lap Chop and Down  15x Each   15#  15x Each  15x ea NT Front and lateral lunge   10x Each Bilat    TB " Palloff Press  DC       TB Trunk Rotation  DC       Leg Press SL Only  Leg Press   SL Only   85# 2x10 Each  95# SL   2x10   Bilat  95# SL 2x10 B/L  95# SL  2x10 Bilat  NT   S/L Elidia                 HEP update/review         Ther Activity                        Gait Training                        Modalities

## 2023-06-08 ENCOUNTER — OFFICE VISIT (OUTPATIENT)
Dept: PHYSICAL THERAPY | Facility: CLINIC | Age: 23
End: 2023-06-08
Payer: COMMERCIAL

## 2023-06-08 DIAGNOSIS — M54.50 CHRONIC MIDLINE LOW BACK PAIN WITHOUT SCIATICA: Primary | ICD-10-CM

## 2023-06-08 DIAGNOSIS — Q65.89 FEMORAL ANTEVERSION OF LEFT LOWER EXTREMITY: ICD-10-CM

## 2023-06-08 DIAGNOSIS — M53.3 NONTRAUMATIC COCCYDYNIA: ICD-10-CM

## 2023-06-08 DIAGNOSIS — G89.29 CHRONIC MIDLINE LOW BACK PAIN WITHOUT SCIATICA: Primary | ICD-10-CM

## 2023-06-08 PROCEDURE — 97112 NEUROMUSCULAR REEDUCATION: CPT | Performed by: PHYSICAL THERAPIST

## 2023-06-08 PROCEDURE — 97140 MANUAL THERAPY 1/> REGIONS: CPT | Performed by: PHYSICAL THERAPIST

## 2023-06-08 PROCEDURE — 97110 THERAPEUTIC EXERCISES: CPT | Performed by: PHYSICAL THERAPIST

## 2023-06-09 ENCOUNTER — OFFICE VISIT (OUTPATIENT)
Dept: PHYSICAL THERAPY | Facility: CLINIC | Age: 23
End: 2023-06-09
Payer: COMMERCIAL

## 2023-06-09 DIAGNOSIS — M53.3 NONTRAUMATIC COCCYDYNIA: ICD-10-CM

## 2023-06-09 DIAGNOSIS — G89.29 CHRONIC MIDLINE LOW BACK PAIN WITHOUT SCIATICA: Primary | ICD-10-CM

## 2023-06-09 DIAGNOSIS — M54.50 CHRONIC MIDLINE LOW BACK PAIN WITHOUT SCIATICA: Primary | ICD-10-CM

## 2023-06-09 DIAGNOSIS — Q65.89 FEMORAL ANTEVERSION OF LEFT LOWER EXTREMITY: ICD-10-CM

## 2023-06-09 PROCEDURE — 97535 SELF CARE MNGMENT TRAINING: CPT | Performed by: PHYSICAL THERAPIST

## 2023-06-09 PROCEDURE — 97110 THERAPEUTIC EXERCISES: CPT | Performed by: PHYSICAL THERAPIST

## 2023-06-09 PROCEDURE — 97140 MANUAL THERAPY 1/> REGIONS: CPT | Performed by: PHYSICAL THERAPIST

## 2023-06-09 NOTE — PROGRESS NOTES
PT Discharge    Today's date: 2023  Patient name: Narciso Mcclellan  : 2000  MRN: [id number]  Referring provider: Eddi Mchugh MD  Dx:   Encounter Diagnosis     ICD-10-CM    1  Chronic midline low back pain without sciatica  M54 50     G89 29       2  Nontraumatic coccydynia  M53 3       3  Femoral anteversion of left lower extremity  Q65 89                      Assessment  Assessment details: PT notes the patient with improved ROM and strength t/o the lumbar spine as well as the bilateral hips and LE with improved trunk/core strength so PT notes the patient has met all therapy goals and is ready for a transition to HEP  Impairments: activity intolerance, impaired physical strength, lacks appropriate home exercise program and pain with function  Understanding of Dx/Px/POC: good   Prognosis: good    Goals  ST  Initiate HEP  MET  2  Decrease pain levels by 25-50%   MET  3  Increase ROM by 25-50%   MET  4  Increase strength by 1-2 mm grades  MET  LT  Improve spinal stability with increase trunk/core strength   MET  2  Decrease limitations with standing, walking and stairs  MET  3  Decrease limitations with trunk movement, lifting and carrying  MET  4  Decrease limitations with transfers and ADL  MET  5   DC with HEP  MET    Plan  Plan details: Transition to HEP  Patient would benefit from: skilled physical therapy  Planned modality interventions: thermotherapy: hydrocollator packs  Planned therapy interventions: manual therapy, neuromuscular re-education, self care, strengthening, stretching, therapeutic exercise, home exercise program, graded exercise and flexibility  Frequency: 2x week  Duration in weeks: 1  Treatment plan discussed with: patient        Subjective Evaluation    History of Present Illness  Mechanism of injury: Patient reports dealing with increase LBP and bilateral hip pain for 6+ years with no MATEO    Patient reports over time the symptoms have worsened with prior treatment with PT and medications with resolution of symptoms but recently the symptoms have worsened with increase pain levels around the sacrum with feeling of weakness in the right hip and LE  Patient denies any radicular symptoms in the bilateral LE  Patient went to ortho MD for evaluation and found to have no degeneration or issues with x-ray of the lumbar spine with referral to OPPT to address LB symptoms  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  Presently patient has attended 13 OPPT sessions and reports 80% improvement since starting PT  Patient reports the LB and hips symptoms have decreased with ability to fully return to sports and recreation without restrictions  Patient reports he is happy with current status and feels he is ready for a transition to HEP  Pain  Current pain ratin  At best pain ratin  At worst pain rating: 3  Location: Lumbar spine   Quality: dull ache  Relieving factors: change in position  Aggravating factors: lifting      Diagnostic Tests  X-ray: normal  Treatments  Previous treatment: physical therapy and medication  Current treatment: physical therapy  Patient Goals  Patient goals for therapy: decreased pain, increased motion, increased strength, independence with ADLs/IADLs and return to sport/leisure activities          Objective     Postural Observations  Seated posture: fair  Standing posture: fair        Tenderness     Lumbar Spine  No tenderness in the spinous process  Left Hip   No tenderness in the PSIS, greater trochanter and sacroiliac joint  Right Hip   No tenderness in the PSIS, greater trochanter and sacroiliac joint       Neurological Testing     Reflexes   Left   Patellar (L4): normal (2+)  Achilles (S1): normal (2+)    Right   Patellar (L4): normal (2+)  Achilles (S1): normal (2+)    Active Range of Motion     Lumbar   Flexion: 65 degrees   Extension: 23 degrees   Left lateral flexion:  WFL  Right lateral "flexion:  WFL  Left rotation:  WFL  Right rotation:  WFL  Left Hip   Flexion: 57 degrees   Extension: 7 degrees   Abduction: [facility]  External rotation (90/90): 25 degrees   Internal rotation (90/90): WFL    Right Hip   Flexion: 53 degrees   Extension: 7 degrees   Abduction: WFL  External rotation (90/90): 21 degrees   Internal rotation (90/90): [facility]    Additional Active Range of Motion Details  PT notes improved ROM and strength t/o the bilateral hip and LE     Passive Range of Motion   Left Hip   Flexion: 61 degrees   Extension: 9 degrees   External rotation (90/90): 27 degrees     Right Hip   Flexion: 57 degrees   Extension: 10 degrees   External rotation (90/90): 24 degrees     Strength/Myotome Testing     Left Hip   Planes of Motion   Flexion: 5  Extension: 5  Abduction: 5  Adduction: 5  External rotation: 5  Internal rotation: 5    Right Hip   Planes of Motion   Flexion: 5  Extension: 5  Abduction: 5  Adduction: 5  External rotation: 5  Internal rotation: 5    Tests     Left Hip   Negative AMANDA MEZA long sit and Ober Thomas: Positive  90/90 SLR: Positive  SLR: Positive  Right Hip   Negative AMANDA MEZA long sit and Ober Thomas: Positive  90/90 SLR: Positive  SLR: Positive                Precautions:  Chronic LBP  POC 6/19/23      Manuals 5/25 6/1 6/6 6/8 6/9   Bilateral HS, quad, piriformis, and gastroc manual hip traction  15 min  15' 15 min  15 min  15 min                            Neuro Re-Ed  6/1      PPU with exhale  2x10   3\" Exhale  2x10 3\" exhale  2x10  3\" Exhale  2x10   3\" Exhale     Prone OAL  DC  DC       Pus/pull cart 50#   5x30 Feet   50# 5x30 feet  60# 5x30 Feet  60#   6x30 Feet     Prone Tball Hip Extension 2x10 Bilat  2x10 b/l 2x10 Bilat LE  2x10 Bilat LE     BOSU 3 way SLR  BOSU   3 way  SLR   15x Bilat   BOSU 3 way SLR 15x b/l  BOSU   3 way SLR   2x10 Bilat  BOSU   3 way SLR  2x10 Bilat     Seated Tball Trunk rotation and PNF  15x Each   4# Ball  15x ea 4# ball  2x10 Each " 6# Ball  2x10 Each   6# Ball    Seated Tball LAQ and hip march  BOSU Squat   2x10   6# Ball BOSU squat 2x10 6# ball  BOSU Squat   2x10   8# ball  BOSU Squat   2x10   8# Ball    Ther Ex   6/1      Elliptical for cardio 10 min L4  10' L4  10 min L5  10 min L5  10 min L5   Ron Chop and Down  15x Each  15x ea NT Front and lateral lunge   10x Each Bilat     TB Palloff Press         TB Trunk Rotation         Leg Press SL Only  95# SL   2x10   Bilat  95# SL 2x10 B/L  95# SL  2x10 Bilat  NT    S/L Elidia                 HEP update/review      30 min    Ther Activity                        Gait Training                        Modalities

## 2023-06-13 NOTE — PROGRESS NOTES
PT Evaluation     Today's date: 2023  Patient name: Eliana Maynard  : 2000  MRN: [id number]  Referring provider: Jelena Villatoro MD  Dx:   Encounter Diagnosis     ICD-10-CM    1  Chronic pain of left knee  M25 562 Ambulatory Referral to Physical Therapy    G89 29       2  Patellofemoral pain syndrome of both knees  M22 2X1 Ambulatory Referral to Physical Therapy    M22 2X2                      Assessment  Assessment details: PT notes the patient with WNL ROM t/o the bilateral knees but poor flexibility t/o the bilateral hip and LE with decrease strength and stability t/o the bilateral knee and LE leading to increase pain levels and functional limitations with need for course of skilled therapy for 4 weeks with focus on manual therapy, strengthening, manual therapy, analgesic modalities, and HEP  Impairments: abnormal or restricted ROM, activity intolerance, impaired balance, impaired physical strength, lacks appropriate home exercise program and pain with function  Understanding of Dx/Px/POC: good   Prognosis: good    Goals  ST  Initiate HEP  2  Decrease pain levels by 25-50%   3  Increase strength by 1-2 mm grades  4  Increase ROM by 25-50%  LT  Decrease limitations with stairs, squatting and lifting activities  2  Full return to sports and recreational activities  3  DC with HEP    Plan  Plan details: PT notes review of POC and findings with the patient who is in agreement with PT recommendations of course of skilled therapy     Patient would benefit from: PT eval and skilled physical therapy  Planned modality interventions: cryotherapy  Planned therapy interventions: manual therapy, neuromuscular re-education, patient education, self care, strengthening, stretching, therapeutic exercise, home exercise program, graded exercise, flexibility and balance  Frequency: 2x week  Duration in weeks: 4  Treatment plan discussed with: patient        Subjective Evaluation    History of Present Illness  Mechanism of injury: Patient reports development of bilateral knee pain about one month ago with left worse as compared to the right  Patient reports no specific MATEO but progressive worsening over time  Patient reports difficulty with stairs, squatting, lifting and sports/recreation  Patient reports the symptoms are intermittent in nature with quick shots of pain and then symptoms dissipate quickly  Patient went to ortho MD for evaluation and was provided a knee brace for the left knee which the patient feels does assist with decreasing symptoms during tennis  Patient reports he was also referred to OPPT to address knee symptoms  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  Pain  Current pain ratin  At best pain ratin  At worst pain ratin  Location: Bilateral knees (L>R)   Quality: sharp, radiating and dull ache  Relieving factors: rest  Aggravating factors: stair climbing and lifting      Diagnostic Tests  X-ray: abnormal  Treatments  Current treatment: physical therapy  Patient Goals  Patient goals for therapy: decreased pain, increased motion, improved balance, increased strength, independence with ADLs/IADLs and return to sport/leisure activities          Objective     Tenderness   Left Knee   Tenderness in the lateral joint line, medial joint line and quadriceps tendon  No tenderness in the LCL (distal), LCL (proximal), MCL (distal), MCL (proximal) and pes anserinus  Right Knee   Tenderness in the lateral joint line, medial joint line and quadriceps tendon  No tenderness in the LCL (distal), LCL (proximal), MCL (distal), MCL (proximal) and pes anserinus       Neurological Testing     Reflexes   Left   Patellar (L4): normal (2+)  Achilles (S1): normal (2+)    Right   Patellar (L4): normal (2+)  Achilles (S1): normal (2+)    Active Range of Motion   Left Hip   Flexion: 51 degrees   Extension: 6 degrees   Abduction: [facility] rotation (90/90): [facility] [place]  Internal rotation (90/90): WFL    Right Hip   Flexion: 60 degrees   Extension: 6 degrees   Abduction: [facility] [place]  External rotation (90/90): [facility] [place]  Internal rotation (90/90): WFL  Left Knee   Normal active range of motion    Right Knee   Normal active range of motion  Left Ankle/Foot   Normal active range of motion    Right Ankle/Foot   Normal active range of motion    Passive Range of Motion   Left Hip   Flexion: 54 degrees   Extension: 8 degrees     Right Hip   Flexion: 62 degrees   Extension: 7 degrees     Mobility   Patellar Mobility:   Left Knee   WFL: medial, lateral, superior and inferior  Right Knee   WFL: medial, lateral, superior and inferior    Patellar Static Positioning   Left Knee: lateral tilt  Right Knee: lateral tilt    Additional Patellar Static Positioning Details  PT notes VMO weakness     Strength/Myotome Testing     Left Hip   Planes of Motion   Flexion: 4  Extension: 5  Abduction: 5  Adduction: 5  External rotation: 4  Internal rotation: 4    Right Hip   Planes of Motion   Flexion: 4+  Extension: 5  Abduction: 5  External rotation: 4  Internal rotation: 4    Left Knee   Flexion: 4+  Extension: 4+  Quadriceps contraction: good    Right Knee   Flexion: 4+  Extension: 4+  Quadriceps contraction: good    Left Ankle/Foot   Normal strength    Right Ankle/Foot   Normal strength    Tests     Left Hip   Negative SUSANA and ANDREIIR  William: Positive  90/90 SLR: Positive  SLR: Positive  Right Hip   Negative SUSANA and ANDREIIR  William: Positive  90/90 SLR: Positive  SLR: Positive  Left Knee   Negative anterior Lachman, lateral Laila, medial Laila, posterior Lachman, valgus stress test at 0 degrees and varus stress test at 0 degrees  Right Knee   Negative anterior Lachman, lateral Laila, medial Laila, posterior Lachman, valgus stress test at 0 degrees and varus stress test at 0 degrees       Swelling     Left Knee Girth Measurement (cm)   Joint line: 41 cm    Right Knee Girth Measurement (cm)   Joint line: 41 cm    Ambulation     Observational Gait   Gait: within functional limits   Walking speed and stride length within functional limits                Precautions:  PMH Chronic LBP       Manuals 6/14       Bilateral HS, hip ABD, gastroc, piriformis and quad with knee PA mobs  15 min                                Neuro Re-Ed        BOSU 3 way SLR        BOSU Squat-Round Down         Front and lateral lunge         S/L Stance with 3 way ball slam         Pistol squat         Bridge with ADD        S/L Squat         Ther Ex        Elliptical         S/L Clam shells         S/L Reverse clam shells         Supine SLR with ER         S/L Leg press                         HEP update/review  15 min        Ther Activity                        Gait Training                        Modalities        CP  PRN

## 2023-06-14 ENCOUNTER — EVALUATION (OUTPATIENT)
Dept: PHYSICAL THERAPY | Facility: CLINIC | Age: 23
End: 2023-06-14
Payer: COMMERCIAL

## 2023-06-14 DIAGNOSIS — M22.2X1 PATELLOFEMORAL PAIN SYNDROME OF BOTH KNEES: ICD-10-CM

## 2023-06-14 DIAGNOSIS — M22.2X2 PATELLOFEMORAL PAIN SYNDROME OF BOTH KNEES: ICD-10-CM

## 2023-06-14 DIAGNOSIS — M25.562 CHRONIC PAIN OF LEFT KNEE: ICD-10-CM

## 2023-06-14 DIAGNOSIS — G89.29 CHRONIC PAIN OF LEFT KNEE: ICD-10-CM

## 2023-06-14 PROCEDURE — 97535 SELF CARE MNGMENT TRAINING: CPT | Performed by: PHYSICAL THERAPIST

## 2023-06-14 PROCEDURE — 97140 MANUAL THERAPY 1/> REGIONS: CPT | Performed by: PHYSICAL THERAPIST

## 2023-06-14 PROCEDURE — 97161 PT EVAL LOW COMPLEX 20 MIN: CPT | Performed by: PHYSICAL THERAPIST

## 2023-06-19 NOTE — PROGRESS NOTES
"Daily Note     Today's date: 2023  Patient name: Micah Jaeger  : 2000  MRN: [id number]  Referring provider: Fritz Moncada MD  Dx:   Encounter Diagnosis     ICD-10-CM    1  Chronic pain of left knee  M25 562     G89 29       2  Patellofemoral pain syndrome of both knees  M22 2X1     M22 2X2                      Subjective: Patient reports to PT ready and enthusiastic to begin his program   \"My hamstrings are hard to stretch out, they are so tight  \"      Objective: See treatment diary below      Assessment: Tolerated treatment well  Patient exhibited good technique with therapeutic exercises and would benefit from continued PT to increase L knee ROM/strength and endurance to improve mobility  Plan: Continue per plan of care        Precautions:  PMH Chronic LBP       Manuals       Bilateral HS, hip ABD, gastroc, piriformis and quad with knee PA mobs  15 min  15'                              Neuro Re-Ed        BOSU 3 way SLR        BOSU Squat-Round Down         Front and lateral lunge   15x ea bilat      S/L Stance with 3 way ball slam         Pistol squat         Bridge with ADD  2x10 3\"hold RFB      S/L Squat         Ther Ex        Elliptical   10'L3      S/L Clam shells   [id number] GTB      S/L Reverse clam shells   [id number]      Supine SLR with ER   [id number]      S/L Leg press                         HEP update/review  15 min        Ther Activity                        Gait Training                        Modalities        CP  PRN                     "

## 2023-06-20 ENCOUNTER — OFFICE VISIT (OUTPATIENT)
Dept: PHYSICAL THERAPY | Facility: CLINIC | Age: 23
End: 2023-06-20
Payer: COMMERCIAL

## 2023-06-20 DIAGNOSIS — M22.2X2 PATELLOFEMORAL PAIN SYNDROME OF BOTH KNEES: ICD-10-CM

## 2023-06-20 DIAGNOSIS — G89.29 CHRONIC PAIN OF LEFT KNEE: Primary | ICD-10-CM

## 2023-06-20 DIAGNOSIS — M22.2X1 PATELLOFEMORAL PAIN SYNDROME OF BOTH KNEES: ICD-10-CM

## 2023-06-20 DIAGNOSIS — M25.562 CHRONIC PAIN OF LEFT KNEE: Primary | ICD-10-CM

## 2023-06-20 PROCEDURE — 97112 NEUROMUSCULAR REEDUCATION: CPT

## 2023-06-20 PROCEDURE — 97140 MANUAL THERAPY 1/> REGIONS: CPT

## 2023-06-20 PROCEDURE — 97110 THERAPEUTIC EXERCISES: CPT

## 2023-06-21 NOTE — PROGRESS NOTES
"Daily Note     Today's date: 2023  Patient name: Isaak Worthy  : 2000  MRN: [id number]  Referring provider: Miquel Ying MD  Dx:   Encounter Diagnosis     ICD-10-CM    1  Chronic pain of left knee  M25 562     G89 29       2  Patellofemoral pain syndrome of both knees  M22 2X1     M22 2X2       3  Chronic midline low back pain without sciatica  M54 50     G89 29       4  Nontraumatic coccydynia  M53 3       5  Femoral anteversion of left lower extremity  Q65 89                      Subjective:  Patient reports he was not able to play tennis the past few days so unsure of how the knees are feeling with the activity  Patient reports the hips and knee still feel tight and stiff  Objective: See treatment diary below      Assessment: Tolerated treatment well  PT notes the patient with decrease strength with poor flexibility t/o the bilateral knee and LE with need for continuation of skilled therapy  Plan: Continue per plan of care        Precautions:  PMH Chronic LBP       Manuals      Bilateral HS, hip ABD, gastroc, piriformis and quad with knee PA mobs  15 min  15' 15 min                              Neuro Re-Ed        BOSU 3 way SLR        BOSU Squat-Round Down         Front and lateral lunge   15x ea bilat 2x10 Each   Bilat LE      S/L Stance with 3 way ball slam         Pistol squat         Bridge with ADD  2x10 3\"hold RFB S/L BOSU Bridge   10x Bilat      S/L Squat         Ther Ex        Elliptical   10'L3 10 min L3      S/L Clam shells   [id number] GTB 2x10 Bilat   Green      S/L Reverse clam shells   [id number] 2x10 Bilat Sides      Supine SLR with ER   [id number]      S/L Leg press    15x Each   155# DL  85# SL                      HEP update/review  15 min        Ther Activity                        Gait Training                        Modalities        CP  PRN                     "

## 2023-06-22 ENCOUNTER — OFFICE VISIT (OUTPATIENT)
Dept: PHYSICAL THERAPY | Facility: CLINIC | Age: 23
End: 2023-06-22
Payer: COMMERCIAL

## 2023-06-22 DIAGNOSIS — G89.29 CHRONIC MIDLINE LOW BACK PAIN WITHOUT SCIATICA: ICD-10-CM

## 2023-06-22 DIAGNOSIS — M22.2X1 PATELLOFEMORAL PAIN SYNDROME OF BOTH KNEES: ICD-10-CM

## 2023-06-22 DIAGNOSIS — M25.562 CHRONIC PAIN OF LEFT KNEE: Primary | ICD-10-CM

## 2023-06-22 DIAGNOSIS — M53.3 NONTRAUMATIC COCCYDYNIA: ICD-10-CM

## 2023-06-22 DIAGNOSIS — G89.29 CHRONIC PAIN OF LEFT KNEE: Primary | ICD-10-CM

## 2023-06-22 DIAGNOSIS — M54.50 CHRONIC MIDLINE LOW BACK PAIN WITHOUT SCIATICA: ICD-10-CM

## 2023-06-22 DIAGNOSIS — M22.2X2 PATELLOFEMORAL PAIN SYNDROME OF BOTH KNEES: ICD-10-CM

## 2023-06-22 DIAGNOSIS — Q65.89 FEMORAL ANTEVERSION OF LEFT LOWER EXTREMITY: ICD-10-CM

## 2023-06-22 PROCEDURE — 97112 NEUROMUSCULAR REEDUCATION: CPT | Performed by: PHYSICAL THERAPIST

## 2023-06-22 PROCEDURE — 97140 MANUAL THERAPY 1/> REGIONS: CPT | Performed by: PHYSICAL THERAPIST

## 2023-06-22 PROCEDURE — 97110 THERAPEUTIC EXERCISES: CPT | Performed by: PHYSICAL THERAPIST

## 2023-06-26 NOTE — PROGRESS NOTES
"Daily Note     Today's date: 2023  Patient name: Rasheed Muñiz  : 2000  MRN: [id number]  Referring provider: Pat Power MD  Dx:   Encounter Diagnosis     ICD-10-CM    1  Chronic pain of left knee  M25 562     G89 29       2  Patellofemoral pain syndrome of both knees  M22 2X1     M22 2X2                      Subjective:  Patient reports the knees have been feeling better with activities at work and with tennis  Patient reports he is happy with the progression with the knees  Objective: See treatment diary below      Assessment: Tolerated treatment well  PT notes continuation of decrease flexibility and strength t/o the bilateral knees with need for continuation of skilled therapy  Plan: Continue per plan of care        Precautions:  PMH Chronic LBP       Manuals     Bilateral HS, hip ABD, gastroc, piriformis and quad with knee PA mobs  15 min  15' 15 min  15 min                             Neuro Re-Ed        BOSU 3 way SLR    10x Bilat LE     BOSU Squat-Round Down     2x10     Front and lateral lunge   15x ea bilat 2x10 Each   Bilat LE  2x10 Each   Bilat LE     S/L Stance with 3 way ball slam         Pistol squat     2x10 Bilat LE     Bridge with ADD  2x10 3\"hold RFB S/L BOSU Bridge   10x Bilat  NT    S/L Squat     2x10 Bilat LE     Ther Ex        Elliptical   10'L3 10 min L3  10 min L3     S/L Clam shells   [id number] GTB 2x10 Bilat   Green  HEP     S/L Reverse clam shells   [id number] 2x10 Bilat Sides  HEP     Supine SLR with ER   [id number]      S/L Leg press    15x Each   155# DL  85# SL  2x10 Each   155# DL 95# SL                     HEP update/review  15 min        Ther Activity                        Gait Training                        Modalities        CP  PRN                     "

## 2023-06-27 ENCOUNTER — OFFICE VISIT (OUTPATIENT)
Dept: PHYSICAL THERAPY | Facility: CLINIC | Age: 23
End: 2023-06-27
Payer: COMMERCIAL

## 2023-06-27 DIAGNOSIS — M25.562 CHRONIC PAIN OF LEFT KNEE: Primary | ICD-10-CM

## 2023-06-27 DIAGNOSIS — G89.29 CHRONIC PAIN OF LEFT KNEE: Primary | ICD-10-CM

## 2023-06-27 DIAGNOSIS — M22.2X2 PATELLOFEMORAL PAIN SYNDROME OF BOTH KNEES: ICD-10-CM

## 2023-06-27 DIAGNOSIS — M22.2X1 PATELLOFEMORAL PAIN SYNDROME OF BOTH KNEES: ICD-10-CM

## 2023-06-27 PROCEDURE — 97110 THERAPEUTIC EXERCISES: CPT | Performed by: PHYSICAL THERAPIST

## 2023-06-27 PROCEDURE — 97112 NEUROMUSCULAR REEDUCATION: CPT | Performed by: PHYSICAL THERAPIST

## 2023-06-27 PROCEDURE — 97140 MANUAL THERAPY 1/> REGIONS: CPT | Performed by: PHYSICAL THERAPIST

## 2023-06-28 NOTE — PROGRESS NOTES
"Daily Note     Today's date: 2023  Patient name: Carlos Frost  : 2000  MRN: [id number]  Referring provider: Zackery Eddy MD  Dx:   Encounter Diagnosis     ICD-10-CM    1  Chronic pain of left knee  M25 562     G89 29       2  Patellofemoral pain syndrome of both knees  M22 2X1     M22 2X2       3  Chronic midline low back pain without sciatica  M54 50     G89 29       4  Nontraumatic coccydynia  M53 3       5  Femoral anteversion of left lower extremity  Q65 89                      Subjective:  Patient reports he was able to play a few sets of tennis last night with no increase in knee pain with performance  Objective: See treatment diary below      Assessment: Tolerated treatment well  PT notes continuation of decrease flexibility and strength t/o the bilateral knees and LE with need for continuation of skilled therapy  Plan: Continue per plan of care        Precautions:  PMH Chronic LBP       Manuals    Bilateral HS, hip ABD, gastroc, piriformis and quad with knee PA mobs  15 min  15' 15 min  15 min                             Neuro Re-Ed        BOSU 3 way SLR    10x Bilat LE  10x Bilat  LE   BOSU Squat-Round Down     2x10  2x10   10# Ball    Front and lateral lunge   15x ea bilat 2x10 Each   Bilat LE  2x10 Each   Bilat LE  Stapleton Lunge F/L   15# 10x Each Bilat    S/L Stance with 3 way ball slam         Pistol squat     2x10 Bilat LE  2x10 Bilat LE    Bridge with ADD  2x10 3\"hold RFB S/L BOSU Bridge   10x Bilat  NT    S/L Squat     2x10 Bilat LE  2x10 Bilat LE    Ther Ex        Elliptical   10'L3 10 min L3  10 min L3  10 min L3    S/L Clam shells   [id number] GTB 2x10 Bilat   Green  HEP     S/L Reverse clam shells   [id number] 2x10 Bilat Sides  HEP     Supine SLR with ER   [id number]      S/L Leg press    15x Each   155# DL  85# SL  2x10 Each   155# DL 95# SL  2x10   Each   165# DL  95# SL                   HEP update/review  15 min        Ther " Activity  6/20                      Gait Training  6/20                      Modalities  6/20      CP  PRN

## 2023-06-29 ENCOUNTER — OFFICE VISIT (OUTPATIENT)
Dept: PHYSICAL THERAPY | Facility: CLINIC | Age: 23
End: 2023-06-29
Payer: COMMERCIAL

## 2023-06-29 DIAGNOSIS — Q65.89 FEMORAL ANTEVERSION OF LEFT LOWER EXTREMITY: ICD-10-CM

## 2023-06-29 DIAGNOSIS — G89.29 CHRONIC PAIN OF LEFT KNEE: Primary | ICD-10-CM

## 2023-06-29 DIAGNOSIS — M54.50 CHRONIC MIDLINE LOW BACK PAIN WITHOUT SCIATICA: ICD-10-CM

## 2023-06-29 DIAGNOSIS — M53.3 NONTRAUMATIC COCCYDYNIA: ICD-10-CM

## 2023-06-29 DIAGNOSIS — G89.29 CHRONIC MIDLINE LOW BACK PAIN WITHOUT SCIATICA: ICD-10-CM

## 2023-06-29 DIAGNOSIS — M22.2X2 PATELLOFEMORAL PAIN SYNDROME OF BOTH KNEES: ICD-10-CM

## 2023-06-29 DIAGNOSIS — M25.562 CHRONIC PAIN OF LEFT KNEE: Primary | ICD-10-CM

## 2023-06-29 DIAGNOSIS — M22.2X1 PATELLOFEMORAL PAIN SYNDROME OF BOTH KNEES: ICD-10-CM

## 2023-06-29 PROCEDURE — 97112 NEUROMUSCULAR REEDUCATION: CPT | Performed by: PHYSICAL THERAPIST

## 2023-06-29 PROCEDURE — 97110 THERAPEUTIC EXERCISES: CPT | Performed by: PHYSICAL THERAPIST

## 2023-06-29 PROCEDURE — 97140 MANUAL THERAPY 1/> REGIONS: CPT | Performed by: PHYSICAL THERAPIST

## 2023-06-30 NOTE — PROGRESS NOTES
PT Re-Evaluation     Today's date: 7/3/2023  Patient name: Maciel Lopez  : 2000  MRN: [id number]  Referring provider: Reta Hemphill MD  Dx:   Encounter Diagnosis     ICD-10-CM    1. Chronic pain of left knee  M25.562     G89.29       2. Patellofemoral pain syndrome of both knees  M22.2X1     M22.2X2                      Assessment  Assessment details: PT notes the patient with WNL ROM t/o the bilateral knees with improved flexibility and strength t/o the bilateral hip and LE so PT notes the patient has met all therapy goals and is ready for a transition to HEP. PT will continue for 1 more session to update/review HEP and then DC with HEP. Impairments: abnormal or restricted ROM, activity intolerance, impaired balance, impaired physical strength, lacks appropriate home exercise program and pain with function  Understanding of Dx/Px/POC: good   Prognosis: good    Goals  ST. Initiate HEP-MET  2. Decrease pain levels by 25-50%-MET   3. Increase strength by 1-2 mm grades-MET  4. Increase ROM by 25-50%-MET  LT. Decrease limitations with stairs, squatting and lifting activities-MET  2. Full return to sports and recreational activities-MET  3.  DC with HEP-Progressing     Plan  Plan details: Transition to HEP. Patient would benefit from: skilled physical therapy  Planned modality interventions: cryotherapy  Planned therapy interventions: manual therapy, neuromuscular re-education, patient education, self care, strengthening, stretching, therapeutic exercise, home exercise program, graded exercise, flexibility and balance  Frequency: 2x week  Duration in weeks: 1  Treatment plan discussed with: patient        Subjective Evaluation    History of Present Illness  Mechanism of injury: Patient reports development of bilateral knee pain about one month ago with left worse as compared to the right. Patient reports no specific MATEO but progressive worsening over time.   Patient reports difficulty with stairs, squatting, lifting and sports/recreation. Patient reports the symptoms are intermittent in nature with quick shots of pain and then symptoms dissipate quickly. Patient went to ortho MD for evaluation and was provided a knee brace for the left knee which the patient feels does assist with decreasing symptoms during tennis. Patient reports he was also referred to OPPT to address knee symptoms. Patient reports he is employed [employer] as  with significant PMH of chronic LBP. Presently the patient has attended 6 sessions of skilled therapy and feels 80-90% improvement since the start of therapy. Patient reports his flexibility is improved with increase LE strength. Patient reports he has fully returned to sports and recreation with no limitations. Patient reports he is happy with current status and feels he is ready for a transition to [facility]. Pain  Current pain ratin  At best pain ratin  At worst pain ratin  Location: Bilateral knees (L>R)   Quality: sharp, radiating and dull ache  Relieving factors: rest  Aggravating factors: stair climbing and lifting  Progression: improved      Diagnostic Tests  X-ray: abnormal  Treatments  Current treatment: physical therapy  Patient Goals  Patient goals for therapy: decreased pain, increased motion, improved balance, increased strength, independence with ADLs/IADLs and return to sport/leisure activities          Objective     Tenderness   Left Knee   No tenderness in the lateral joint line, LCL (distal), LCL (proximal), MCL (distal), MCL (proximal), medial joint line, pes anserinus and quadriceps tendon. Right Knee   No tenderness in the lateral joint line, LCL (distal), LCL (proximal), MCL (distal), MCL (proximal), medial joint line, pes anserinus and quadriceps tendon.      Neurological Testing     Reflexes   Left   Patellar (L4): normal (2+)  Achilles (S1): normal (2+)    Right   Patellar (L4): normal (2+)  Achilles (S1): normal (2+)    Active Range of Motion   Left Hip   Flexion: 57 degrees   Extension: 9 degrees   Abduction: WFL  External rotation (90/90): [place] [place]  Internal rotation (90/90): WFL    Right Hip   Flexion: 61 degrees   Extension: 8 degrees   Abduction: WFL  External rotation (90/90): [place] [place]  Internal rotation (90/90): WFL  Left Knee   Normal active range of motion    Right Knee   Normal active range of motion  Left Ankle/Foot   Normal active range of motion    Right Ankle/Foot   Normal active range of motion    Passive Range of Motion   Left Hip   Flexion: 60 degrees   Extension: 11 degrees     Right Hip   Flexion: 62 degrees   Extension: 11 degrees     Mobility   Patellar Mobility:   Left Knee   WFL: medial, lateral, superior and inferior. Right Knee   WFL: medial, lateral, superior and inferior    Patellar Static Positioning   Left Knee: lateral tilt  Right Knee: lateral tilt    Additional Patellar Static Positioning Details  PT notes VMO weakness     Strength/Myotome Testing     Left Hip   Planes of Motion   Flexion: 5  Extension: 5  Abduction: 5  Adduction: 5  External rotation: 5  Internal rotation: 5    Right Hip   Planes of Motion   Flexion: 5  Extension: 5  Abduction: 5  Adduction: 5  External rotation: 5  Internal rotation: 5    Left Knee   Flexion: 5  Extension: 5  Quadriceps contraction: good    Right Knee   Flexion: 5  Extension: 5  Quadriceps contraction: good    Left Ankle/Foot   Normal strength    Right Ankle/Foot   Normal strength    Tests     Left Hip   Negative Tania. William: Positive. 90/90 SLR: Positive. SLR: Positive. Right Hip   Negative Deanna Thomas: Positive. 90/90 SLR: Positive. SLR: Positive. Left Knee   Negative anterior Lachman, lateral Laila, medial Laila, posterior Lachman, valgus stress test at 0 degrees and varus stress test at 0 degrees.      Right Knee   Negative anterior Lachman, lateral Laila, medial Laila, posterior Lachman, valgus stress test at 0 degrees and varus stress test at 0 degrees. Swelling     Left Knee Girth Measurement (cm)   Joint line: 41 cm    Right Knee Girth Measurement (cm)   Joint line: 41 cm    Ambulation     Observational Gait   Gait: within functional limits   Walking speed and stride length within functional limits.               Precautions:  PMH Chronic LBP       Manuals 6/20 6/22 6/27 6/29 7/3   Bilateral HS, hip ABD, gastroc, piriformis and quad with knee PA mobs  15' 15 min  15 min  15 min  15 min                            Neuro Re-Ed 6/20       BOSU 3 way SLR   10x Bilat LE  10x Bilat  LE DC   BOSU Squat-Round Down    2x10  2x10   10# Ball  2x10   10# Ball    Front and lateral lunge  15x ea bilat 2x10 Each   Bilat LE  2x10 Each   Bilat LE  [place] Lunge F/L   15# 10x Each Bilat  15#   10x Each   Bilat    S/L Stance with 3 way ball slam         Pistol squat    2x10 Bilat LE  2x10 Bilat LE  2x10 Bilat LE    Bridge with ADD 2x10 3"hold RFB S/L BOSU Bridge   10x Bilat  NT     S/L Squat    2x10 Bilat LE  2x10 Bilat LE  2x10 Bilat LE    Ther Ex 6/20       Elliptical  10'L3 10 min L3  10 min L3  10 min L3  10 min L3    S/L Clam shells  [id number] 2x10 Bilat   Green  HEP      S/L Reverse clam shells  [id number] 2x10 Bilat Sides  HEP      Supine SLR with ER  [id number]       S/L Leg press   15x Each   155# DL  85# SL  2x10 Each   155# DL 95# SL  2x10   Each   165# DL  95# SL 2x10 Each   185# DL  95# SL                   HEP update/review         Ther Activity 6/20                       Gait Training 6/20                       Modalities 6/20       CP

## 2023-07-03 ENCOUNTER — EVALUATION (OUTPATIENT)
Dept: PHYSICAL THERAPY | Facility: CLINIC | Age: 23
End: 2023-07-03
Payer: COMMERCIAL

## 2023-07-03 DIAGNOSIS — M22.2X2 PATELLOFEMORAL PAIN SYNDROME OF BOTH KNEES: ICD-10-CM

## 2023-07-03 DIAGNOSIS — M22.2X1 PATELLOFEMORAL PAIN SYNDROME OF BOTH KNEES: ICD-10-CM

## 2023-07-03 DIAGNOSIS — G89.29 CHRONIC PAIN OF LEFT KNEE: Primary | ICD-10-CM

## 2023-07-03 DIAGNOSIS — M25.562 CHRONIC PAIN OF LEFT KNEE: Primary | ICD-10-CM

## 2023-07-03 PROCEDURE — 97140 MANUAL THERAPY 1/> REGIONS: CPT | Performed by: PHYSICAL THERAPIST

## 2023-07-03 PROCEDURE — 97110 THERAPEUTIC EXERCISES: CPT | Performed by: PHYSICAL THERAPIST

## 2023-07-03 PROCEDURE — 97112 NEUROMUSCULAR REEDUCATION: CPT | Performed by: PHYSICAL THERAPIST

## 2023-07-05 ENCOUNTER — APPOINTMENT (OUTPATIENT)
Dept: PHYSICAL THERAPY | Facility: CLINIC | Age: 23
End: 2023-07-05
Payer: COMMERCIAL

## 2023-07-05 NOTE — PROGRESS NOTES
PT Discharge    Today's date: 2023  Patient name: Farhan Barnhart  : 2000  MRN: [id number]  Referring provider: Power Rodriguez MD  Dx:   Encounter Diagnosis     ICD-10-CM    1. Chronic pain of left knee  M25.562     G89.29       2. Patellofemoral pain syndrome of both knees  M22.2X1     M22.2X2                      Assessment  Assessment details: PT notes the patient with WNL ROM t/o the bilateral knees with improved flexibility and strength t/o the bilateral hip and LE so PT notes the patient has met all therapy goals and is ready for a transition to HEP. Impairments: abnormal or restricted ROM, activity intolerance, impaired balance, impaired physical strength, lacks appropriate home exercise program and pain with function  Understanding of Dx/Px/POC: good   Prognosis: good    Goals  ST. Initiate HEP-MET  2. Decrease pain levels by 25-50%-MET   3. Increase strength by 1-2 mm grades-MET  4. Increase ROM by 25-50%-MET  LT. Decrease limitations with stairs, squatting and lifting activities-MET  2. Full return to sports and recreational activities-MET  3.  DC with HEP-MET    Plan  Plan details: Transition to HEP. Patient would benefit from: skilled physical therapy  Planned modality interventions: cryotherapy  Planned therapy interventions: manual therapy, neuromuscular re-education, patient education, self care, strengthening, stretching, therapeutic exercise, home exercise program, graded exercise, flexibility and balance  Frequency: 2x week  Duration in weeks: 1  Treatment plan discussed with: patient        Subjective Evaluation    History of Present Illness  Mechanism of injury: Patient reports development of bilateral knee pain about one month ago with left worse as compared to the right. Patient reports no specific MATEO but progressive worsening over time. Patient reports difficulty with stairs, squatting, lifting and sports/recreation.   Patient reports the symptoms are intermittent in nature with quick shots of pain and then symptoms dissipate quickly. Patient went to ortho MD for evaluation and was provided a knee brace for the left knee which the patient feels does assist with decreasing symptoms during tennis. Patient reports he was also referred to OPPT to address knee symptoms. Patient reports he is employed [employer] as  with significant PMH of chronic LBP. Presently the patient has attended 7 sessions of skilled therapy and feels 80-90% improvement since the start of therapy. Patient reports his flexibility is improved with increase LE strength. Patient reports he has fully returned to sports and recreation with no limitations. Patient reports he is happy with current status and feels he is ready for a transition to [facility]. Pain  Current pain ratin  At best pain ratin  At worst pain ratin  Location: Bilateral knees (L>R)   Quality: sharp, radiating and dull ache  Relieving factors: rest  Aggravating factors: stair climbing and lifting  Progression: improved      Diagnostic Tests  X-ray: abnormal  Treatments  Current treatment: physical therapy  Patient Goals  Patient goals for therapy: decreased pain, increased motion, improved balance, increased strength, independence with ADLs/IADLs and return to sport/leisure activities          Objective     Tenderness   Left Knee   No tenderness in the lateral joint line, LCL (distal), LCL (proximal), MCL (distal), MCL (proximal), medial joint line, pes anserinus and quadriceps tendon. Right Knee   No tenderness in the lateral joint line, LCL (distal), LCL (proximal), MCL (distal), MCL (proximal), medial joint line, pes anserinus and quadriceps tendon.      Neurological Testing     Reflexes   Left   Patellar (L4): normal (2+)  Achilles (S1): normal (2+)    Right   Patellar (L4): normal (2+)  Achilles (S1): normal (2+)    Active Range of Motion   Left Hip   Flexion: 57 degrees   Extension: 9 degrees Abduction: [facility] [place]  External rotation (90/90): [facility] [place]  Internal rotation (90/90): WFL    Right Hip   Flexion: 61 degrees   Extension: 8 degrees   Abduction: WFL  External rotation (90/90): [facility] [place]  Internal rotation (90/90): WFL  Left Knee   Normal active range of motion    Right Knee   Normal active range of motion  Left Ankle/Foot   Normal active range of motion    Right Ankle/Foot   Normal active range of motion    Passive Range of Motion   Left Hip   Flexion: 60 degrees   Extension: 11 degrees     Right Hip   Flexion: 62 degrees   Extension: 11 degrees     Mobility   Patellar Mobility:   Left Knee   WFL: medial, lateral, superior and inferior. Right Knee   WFL: medial, lateral, superior and inferior    Patellar Static Positioning   Left Knee: lateral tilt  Right Knee: lateral tilt    Additional Patellar Static Positioning Details  PT notes VMO weakness     Strength/Myotome Testing     Left Hip   Planes of Motion   Flexion: 5  Extension: 5  Abduction: 5  Adduction: 5  External rotation: 5  Internal rotation: 5    Right Hip   Planes of Motion   Flexion: 5  Extension: 5  Abduction: 5  Adduction: 5  External rotation: 5  Internal rotation: 5    Left Knee   Flexion: 5  Extension: 5  Quadriceps contraction: good    Right Knee   Flexion: 5  Extension: 5  Quadriceps contraction: good    Left Ankle/Foot   Normal strength    Right Ankle/Foot   Normal strength    Tests     Left Hip   Negative Tania. William: Positive. 90/90 SLR: Positive. SLR: Positive. Right Hip   Negative Deanna Tohmas: Positive. 90/90 SLR: Positive. SLR: Positive. Left Knee   Negative anterior Lachman, lateral Laila, medial Laila, posterior Lachman, valgus stress test at 0 degrees and varus stress test at 0 degrees. Right Knee   Negative anterior Lachman, lateral Laila, medial Laila, posterior Lachman, valgus stress test at 0 degrees and varus stress test at 0 degrees.      Swelling     Left Knee Girth Measurement (cm)   Joint line: 41 cm    Right Knee Girth Measurement (cm)   Joint line: 41 cm    Ambulation     Observational Gait   Gait: within functional limits   Walking speed and stride length within functional limits.               Precautions:  PMH Chronic LBP       Manuals 6/22 6/27 6/29 7/3 7/7   Bilateral HS, hip ABD, gastroc, piriformis and quad with knee PA mobs  15 min  15 min  15 min  15 min                             Neuro Re-Ed        BOSU 3 way SLR  10x Bilat LE  10x Bilat  LE DC    BOSU Squat-Round Down   2x10  2x10   10# Ball  2x10   10# [employer] and lateral lunge  2x10 Each   Bilat LE  2x10 Each   Bilat LE  [place] Lunge F/L   15# 10x Each Bilat  15#   10x Each   Bilat     S/L Stance with 3 way ball slam         Pistol squat   2x10 Bilat LE  2x10 Bilat LE  2x10 Bilat LE     Bridge with ADD S/L BOSU Bridge   10x Bilat  NT      S/L Squat   2x10 Bilat LE  2x10 Bilat LE  2x10 Bilat LE     Ther Ex        Elliptical  10 min L3  10 min L3  10 min L3  10 min L3     S/L Clam shells  2x10 Bilat   Green  HEP       S/L Reverse clam shells  2x10 Bilat Sides  HEP       Supine SLR with ER         S/L Leg press  15x Each   155# DL  85# SL  2x10 Each   155# DL 95# SL  2x10   Each   165# DL  95# SL 2x10 Each   185# DL  95# SL                    HEP update/review         Ther Activity                        Gait Training                        Modalities        CP

## 2023-07-06 ENCOUNTER — OFFICE VISIT (OUTPATIENT)
Dept: PHYSICAL THERAPY | Facility: CLINIC | Age: 23
End: 2023-07-06
Payer: COMMERCIAL

## 2023-07-06 DIAGNOSIS — M25.562 CHRONIC PAIN OF LEFT KNEE: Primary | ICD-10-CM

## 2023-07-06 DIAGNOSIS — M22.2X1 PATELLOFEMORAL PAIN SYNDROME OF BOTH KNEES: ICD-10-CM

## 2023-07-06 DIAGNOSIS — M22.2X2 PATELLOFEMORAL PAIN SYNDROME OF BOTH KNEES: ICD-10-CM

## 2023-07-06 DIAGNOSIS — G89.29 CHRONIC PAIN OF LEFT KNEE: Primary | ICD-10-CM

## 2023-07-06 PROCEDURE — 97535 SELF CARE MNGMENT TRAINING: CPT | Performed by: PHYSICAL THERAPIST

## 2023-07-06 PROCEDURE — 97140 MANUAL THERAPY 1/> REGIONS: CPT | Performed by: PHYSICAL THERAPIST

## 2023-07-06 NOTE — PROGRESS NOTES
PT Discharge    Today's date: 2023  Patient name: Trace Gallagher  : 2000  MRN: [id number]  Referring provider: Leah Moise MD  Dx:   Encounter Diagnosis     ICD-10-CM    1. Chronic pain of left knee  M25.562     G89.29       2. Patellofemoral pain syndrome of both knees  M22.2X1     M22.2X2                      Assessment  Assessment details: PT notes the patient with WNL ROM t/o the bilateral knees with improved flexibility and strength t/o the bilateral hip and LE so PT notes the patient has met all therapy goals and is ready for a transition to HEP. Impairments: abnormal or restricted ROM, activity intolerance, impaired balance, impaired physical strength, lacks appropriate home exercise program and pain with function  Understanding of Dx/Px/POC: good   Prognosis: good    Goals  ST. Initiate HEP-MET  2. Decrease pain levels by 25-50%-MET   3. Increase strength by 1-2 mm grades-MET  4. Increase ROM by 25-50%-MET  LT. Decrease limitations with stairs, squatting and lifting activities-MET  2. Full return to sports and recreational activities-MET  3.  DC with HEP-MET    Plan  Plan details: Transition to HEP. Patient would benefit from: skilled physical therapy  Planned modality interventions: cryotherapy  Planned therapy interventions: manual therapy, neuromuscular re-education, patient education, self care, strengthening, stretching, therapeutic exercise, home exercise program, graded exercise, flexibility and balance  Frequency: 2x week  Duration in weeks: 1  Treatment plan discussed with: patient        Subjective Evaluation    History of Present Illness  Mechanism of injury: Patient reports development of bilateral knee pain about one month ago with left worse as compared to the right. Patient reports no specific MATEO but progressive worsening over time. Patient reports difficulty with stairs, squatting, lifting and sports/recreation.   Patient reports the symptoms are intermittent in nature with quick shots of pain and then symptoms dissipate quickly. Patient went to ortho MD for evaluation and was provided a knee brace for the left knee which the patient feels does assist with decreasing symptoms during tennis. Patient reports he was also referred to OPPT to address knee symptoms. Patient reports he is employed [employer] as  with significant PMH of chronic LBP. Presently the patient has attended 6 sessions of skilled therapy and feels 80-90% improvement since the start of therapy. Patient reports his flexibility is improved with increase LE strength. Patient reports he has fully returned to sports and recreation with no limitations. Patient reports he is happy with current status and feels he is ready for a transition to [facility]. Pain  Current pain ratin  At best pain ratin  At worst pain ratin  Location: Bilateral knees (L>R)   Quality: sharp, radiating and dull ache  Relieving factors: rest  Aggravating factors: stair climbing and lifting  Progression: improved      Diagnostic Tests  X-ray: abnormal  Treatments  Current treatment: physical therapy  Patient Goals  Patient goals for therapy: decreased pain, increased motion, improved balance, increased strength, independence with ADLs/IADLs and return to sport/leisure activities          Objective     Tenderness   Left Knee   No tenderness in the lateral joint line, LCL (distal), LCL (proximal), MCL (distal), MCL (proximal), medial joint line, pes anserinus and quadriceps tendon. Right Knee   No tenderness in the lateral joint line, LCL (distal), LCL (proximal), MCL (distal), MCL (proximal), medial joint line, pes anserinus and quadriceps tendon.      Neurological Testing     Reflexes   Left   Patellar (L4): normal (2+)  Achilles (S1): normal (2+)    Right   Patellar (L4): normal (2+)  Achilles (S1): normal (2+)    Active Range of Motion   Left Hip   Flexion: 57 degrees   Extension: 9 degrees Abduction: [facility] [place]  External rotation (90/90): [facility] [place]  Internal rotation (90/90): WFL    Right Hip   Flexion: 61 degrees   Extension: 8 degrees   Abduction: WFL  External rotation (90/90): [facility] [place]  Internal rotation (90/90): WFL  Left Knee   Normal active range of motion    Right Knee   Normal active range of motion  Left Ankle/Foot   Normal active range of motion    Right Ankle/Foot   Normal active range of motion    Passive Range of Motion   Left Hip   Flexion: 60 degrees   Extension: 11 degrees     Right Hip   Flexion: 62 degrees   Extension: 11 degrees     Mobility   Patellar Mobility:   Left Knee   WFL: medial, lateral, superior and inferior. Right Knee   WFL: medial, lateral, superior and inferior    Patellar Static Positioning   Left Knee: lateral tilt  Right Knee: lateral tilt    Additional Patellar Static Positioning Details  PT notes VMO weakness     Strength/Myotome Testing     Left Hip   Planes of Motion   Flexion: 5  Extension: 5  Abduction: 5  Adduction: 5  External rotation: 5  Internal rotation: 5    Right Hip   Planes of Motion   Flexion: 5  Extension: 5  Abduction: 5  Adduction: 5  External rotation: 5  Internal rotation: 5    Left Knee   Flexion: 5  Extension: 5  Quadriceps contraction: good    Right Knee   Flexion: 5  Extension: 5  Quadriceps contraction: good    Left Ankle/Foot   Normal strength    Right Ankle/Foot   Normal strength    Tests     Left Hip   Negative Tania. William: Positive. 90/90 SLR: Positive. SLR: Positive. Right Hip   Negative Deanna Thomas: Positive. 90/90 SLR: Positive. SLR: Positive. Left Knee   Negative anterior Lachman, lateral Laila, medial Laila, posterior Lachman, valgus stress test at 0 degrees and varus stress test at 0 degrees. Right Knee   Negative anterior Lachman, lateral Laila, medial Laila, posterior Lachman, valgus stress test at 0 degrees and varus stress test at 0 degrees.      Swelling     Left Knee Girth Measurement (cm)   Joint line: 41 cm    Right Knee Girth Measurement (cm)   Joint line: 41 cm    Ambulation     Observational Gait   Gait: within functional limits   Walking speed and stride length within functional limits.               Precautions:  PMH Chronic LBP       Manuals 6/22 6/27 6/29 7/3 7/6   Bilateral HS, hip ABD, gastroc, piriformis and quad with knee PA mobs  15 min  15 min  15 min  15 min  15 min                            Neuro Re-Ed        BOSU 3 way SLR  10x Bilat LE  10x Bilat  LE DC    BOSU Squat-Round Down   2x10  2x10   10# Ball  2x10   10# [employer] and lateral lunge  2x10 Each   Bilat LE  2x10 Each   Bilat LE  [employer] Lunge F/L   15# 10x Each Bilat  15#   10x Each   Bilat     S/L Stance with 3 way ball slam         Pistol squat   2x10 Bilat LE  2x10 Bilat LE  2x10 Bilat LE     Bridge with ADD S/L BOSU Bridge   10x Bilat  NT      S/L Squat   2x10 Bilat LE  2x10 Bilat LE  2x10 Bilat LE     Ther Ex        Elliptical  10 min L3  10 min L3  10 min L3  10 min L3  10 min L3    S/L Clam shells  2x10 Bilat   Green  HEP       S/L Reverse clam shells  2x10 Bilat Sides  HEP       Supine SLR with ER         S/L Leg press  15x Each   155# DL  85# SL  2x10 Each   155# DL 95# SL  2x10   Each   165# DL  95# SL 2x10 Each   185# DL  95# SL                    HEP update/review      30 min    Ther Activity                        Gait Training                        Modalities        CP

## 2023-07-07 ENCOUNTER — APPOINTMENT (OUTPATIENT)
Dept: PHYSICAL THERAPY | Facility: CLINIC | Age: 23
End: 2023-07-07
Payer: COMMERCIAL

## 2024-06-03 ENCOUNTER — TELEPHONE (OUTPATIENT)
Age: 24
End: 2024-06-03

## 2024-06-03 NOTE — TELEPHONE ENCOUNTER
Caller: Patient    Doctor: Arvind     Reason for call: Patient is calling stating he has twisted femurs and has been in constant pain for 9 years. He hyper extended his knee the other day and ever since then his pain has been gone. He is asking how this is possible but injuring his knee all his other symptoms disappeared.     Call back#: [phone]

## 2024-06-20 RX ORDER — FLUTICASONE PROPIONATE 50 MCG
SPRAY, SUSPENSION (ML) NASAL
COMMUNITY
Start: 2024-05-24

## 2024-06-21 ENCOUNTER — HOSPITAL ENCOUNTER (OUTPATIENT)
Dept: RADIOLOGY | Facility: CLINIC | Age: 24
End: 2024-06-21
Payer: COMMERCIAL

## 2024-06-21 VITALS
TEMPERATURE: 97.6 F | BODY MASS INDEX: 29.9 KG/M2 | HEART RATE: 78 BPM | SYSTOLIC BLOOD PRESSURE: 120 MMHG | WEIGHT: 233 LBS | HEIGHT: 74 IN | OXYGEN SATURATION: 99 % | DIASTOLIC BLOOD PRESSURE: 78 MMHG

## 2024-06-21 DIAGNOSIS — S89.92XA INJURY OF LEFT KNEE, INITIAL ENCOUNTER: Primary | ICD-10-CM

## 2024-06-21 DIAGNOSIS — Q68.2 PATELLA ALTA: ICD-10-CM

## 2024-06-21 DIAGNOSIS — M22.2X1 PATELLOFEMORAL PAIN SYNDROME OF BOTH KNEES: ICD-10-CM

## 2024-06-21 DIAGNOSIS — M25.562 ACUTE PAIN OF LEFT KNEE: ICD-10-CM

## 2024-06-21 DIAGNOSIS — S89.92XA INJURY OF LEFT KNEE, INITIAL ENCOUNTER: ICD-10-CM

## 2024-06-21 DIAGNOSIS — M22.2X2 PATELLOFEMORAL PAIN SYNDROME OF BOTH KNEES: ICD-10-CM

## 2024-06-21 DIAGNOSIS — S83.8X2A SPRAIN OF OTHER LIGAMENT OF LEFT KNEE, INITIAL ENCOUNTER: ICD-10-CM

## 2024-06-21 PROCEDURE — 99214 OFFICE O/P EST MOD 30 MIN: CPT | Performed by: STUDENT IN AN ORGANIZED HEALTH CARE EDUCATION/TRAINING PROGRAM

## 2024-06-21 PROCEDURE — 73564 X-RAY EXAM KNEE 4 OR MORE: CPT
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
